# Patient Record
Sex: FEMALE | Race: WHITE | NOT HISPANIC OR LATINO | Employment: FULL TIME | URBAN - METROPOLITAN AREA
[De-identification: names, ages, dates, MRNs, and addresses within clinical notes are randomized per-mention and may not be internally consistent; named-entity substitution may affect disease eponyms.]

---

## 2020-04-17 ENCOUNTER — HOSPITAL ENCOUNTER (OUTPATIENT)
Facility: HOSPITAL | Age: 40
Discharge: HOME | End: 2020-04-19
Attending: EMERGENCY MEDICINE | Admitting: INTERNAL MEDICINE
Payer: COMMERCIAL

## 2020-04-17 DIAGNOSIS — J02.9 PHARYNGITIS, UNSPECIFIED ETIOLOGY: ICD-10-CM

## 2020-04-17 DIAGNOSIS — R00.0 TACHYCARDIA: Primary | ICD-10-CM

## 2020-04-17 DIAGNOSIS — R07.9 CHEST PAIN: ICD-10-CM

## 2020-04-17 DIAGNOSIS — R50.9 ACUTE FEBRILE ILLNESS: ICD-10-CM

## 2020-04-17 DIAGNOSIS — J02.9 SORE THROAT: ICD-10-CM

## 2020-04-17 DIAGNOSIS — R05.9 COUGH: ICD-10-CM

## 2020-04-17 LAB
ALBUMIN SERPL BCP-MCNC: 4 G/DL (ref 3.5–5.2)
ALP SERPL-CCNC: 60 U/L (ref 55–135)
ALT SERPL W/O P-5'-P-CCNC: 16 U/L (ref 10–44)
ANION GAP SERPL CALC-SCNC: 12 MMOL/L (ref 8–16)
AST SERPL-CCNC: 13 U/L (ref 10–40)
B-HCG UR QL: NEGATIVE
BASOPHILS # BLD AUTO: 0.02 K/UL (ref 0–0.2)
BASOPHILS NFR BLD: 0.2 % (ref 0–1.9)
BILIRUB SERPL-MCNC: 0.5 MG/DL (ref 0.1–1)
BILIRUB UR QL STRIP: NEGATIVE
BNP SERPL-MCNC: 13 PG/ML (ref 0–99)
BUN SERPL-MCNC: 14 MG/DL (ref 6–20)
CALCIUM SERPL-MCNC: 8.6 MG/DL (ref 8.7–10.5)
CHLORIDE SERPL-SCNC: 103 MMOL/L (ref 95–110)
CLARITY UR: CLEAR
CO2 SERPL-SCNC: 23 MMOL/L (ref 23–29)
COLOR UR: YELLOW
CREAT SERPL-MCNC: 0.6 MG/DL (ref 0.5–1.4)
CTP QC/QA: YES
D DIMER PPP IA.FEU-MCNC: 0.49 UG/ML FEU
DEPRECATED S PYO AG THROAT QL EIA: NEGATIVE
DIFFERENTIAL METHOD: ABNORMAL
EOSINOPHIL # BLD AUTO: 0.1 K/UL (ref 0–0.5)
EOSINOPHIL NFR BLD: 0.5 % (ref 0–8)
ERYTHROCYTE [DISTWIDTH] IN BLOOD BY AUTOMATED COUNT: 14.8 % (ref 11.5–14.5)
EST. GFR  (AFRICAN AMERICAN): >60 ML/MIN/1.73 M^2
EST. GFR  (NON AFRICAN AMERICAN): >60 ML/MIN/1.73 M^2
GLUCOSE SERPL-MCNC: 97 MG/DL (ref 70–110)
GLUCOSE UR QL STRIP: NEGATIVE
HCT VFR BLD AUTO: 36.9 % (ref 37–48.5)
HGB BLD-MCNC: 12 G/DL (ref 12–16)
HGB UR QL STRIP: NEGATIVE
IMM GRANULOCYTES # BLD AUTO: 0.02 K/UL (ref 0–0.04)
IMM GRANULOCYTES NFR BLD AUTO: 0.2 % (ref 0–0.5)
KETONES UR QL STRIP: ABNORMAL
LACTATE SERPL-SCNC: 0.8 MMOL/L (ref 0.5–1.9)
LEUKOCYTE ESTERASE UR QL STRIP: NEGATIVE
LYMPHOCYTES # BLD AUTO: 0.8 K/UL (ref 1–4.8)
LYMPHOCYTES NFR BLD: 7.3 % (ref 18–48)
MCH RBC QN AUTO: 27.3 PG (ref 27–31)
MCHC RBC AUTO-ENTMCNC: 32.5 G/DL (ref 32–36)
MCV RBC AUTO: 84 FL (ref 82–98)
MONOCYTES # BLD AUTO: 0.8 K/UL (ref 0.3–1)
MONOCYTES NFR BLD: 7.4 % (ref 4–15)
NEUTROPHILS # BLD AUTO: 9.1 K/UL (ref 1.8–7.7)
NEUTROPHILS NFR BLD: 84.4 % (ref 38–73)
NITRITE UR QL STRIP: NEGATIVE
NRBC BLD-RTO: 0 /100 WBC
PH UR STRIP: 6 [PH] (ref 5–8)
PLATELET # BLD AUTO: 234 K/UL (ref 150–350)
PMV BLD AUTO: 10.1 FL (ref 9.2–12.9)
POTASSIUM SERPL-SCNC: 3.7 MMOL/L (ref 3.5–5.1)
PROT SERPL-MCNC: 7.4 G/DL (ref 6–8.4)
PROT UR QL STRIP: NEGATIVE
RBC # BLD AUTO: 4.39 M/UL (ref 4–5.4)
SARS-COV-2 RDRP RESP QL NAA+PROBE: NEGATIVE
SODIUM SERPL-SCNC: 138 MMOL/L (ref 136–145)
SP GR UR STRIP: 1.01 (ref 1–1.03)
TROPONIN I SERPL DL<=0.01 NG/ML-MCNC: <0.03 NG/ML
TSH SERPL DL<=0.005 MIU/L-ACNC: 0.84 UIU/ML (ref 0.34–5.6)
URN SPEC COLLECT METH UR: ABNORMAL
UROBILINOGEN UR STRIP-ACNC: NEGATIVE EU/DL
WBC # BLD AUTO: 10.82 K/UL (ref 3.9–12.7)

## 2020-04-17 PROCEDURE — 83605 ASSAY OF LACTIC ACID: CPT

## 2020-04-17 PROCEDURE — 84443 ASSAY THYROID STIM HORMONE: CPT

## 2020-04-17 PROCEDURE — U0002 COVID-19 LAB TEST NON-CDC: HCPCS

## 2020-04-17 PROCEDURE — 96361 HYDRATE IV INFUSION ADD-ON: CPT

## 2020-04-17 PROCEDURE — 87880 STREP A ASSAY W/OPTIC: CPT

## 2020-04-17 PROCEDURE — 25000003 PHARM REV CODE 250: Performed by: EMERGENCY MEDICINE

## 2020-04-17 PROCEDURE — 99285 EMERGENCY DEPT VISIT HI MDM: CPT | Mod: 25

## 2020-04-17 PROCEDURE — G0378 HOSPITAL OBSERVATION PER HR: HCPCS

## 2020-04-17 PROCEDURE — 85025 COMPLETE CBC W/AUTO DIFF WBC: CPT

## 2020-04-17 PROCEDURE — U0002 COVID-19 LAB TEST NON-CDC: HCPCS | Mod: 91

## 2020-04-17 PROCEDURE — 84484 ASSAY OF TROPONIN QUANT: CPT

## 2020-04-17 PROCEDURE — 80053 COMPREHEN METABOLIC PANEL: CPT

## 2020-04-17 PROCEDURE — 81025 URINE PREGNANCY TEST: CPT | Performed by: EMERGENCY MEDICINE

## 2020-04-17 PROCEDURE — 87081 CULTURE SCREEN ONLY: CPT

## 2020-04-17 PROCEDURE — 36415 COLL VENOUS BLD VENIPUNCTURE: CPT

## 2020-04-17 PROCEDURE — 85379 FIBRIN DEGRADATION QUANT: CPT

## 2020-04-17 PROCEDURE — 81003 URINALYSIS AUTO W/O SCOPE: CPT

## 2020-04-17 PROCEDURE — 93005 ELECTROCARDIOGRAM TRACING: CPT | Performed by: INTERNAL MEDICINE

## 2020-04-17 PROCEDURE — 83880 ASSAY OF NATRIURETIC PEPTIDE: CPT

## 2020-04-17 RX ORDER — AMOXICILLIN 250 MG/1
500 CAPSULE ORAL
Status: COMPLETED | OUTPATIENT
Start: 2020-04-17 | End: 2020-04-17

## 2020-04-17 RX ORDER — ACETAMINOPHEN 325 MG/1
650 TABLET ORAL EVERY 6 HOURS PRN
Status: DISCONTINUED | OUTPATIENT
Start: 2020-04-17 | End: 2020-04-19 | Stop reason: HOSPADM

## 2020-04-17 RX ORDER — GLUCAGON 1 MG
1 KIT INJECTION
Status: DISCONTINUED | OUTPATIENT
Start: 2020-04-17 | End: 2020-04-19 | Stop reason: HOSPADM

## 2020-04-17 RX ORDER — IBUPROFEN 200 MG
24 TABLET ORAL
Status: DISCONTINUED | OUTPATIENT
Start: 2020-04-17 | End: 2020-04-19 | Stop reason: HOSPADM

## 2020-04-17 RX ORDER — ENOXAPARIN SODIUM 100 MG/ML
40 INJECTION SUBCUTANEOUS EVERY 24 HOURS
Status: DISCONTINUED | OUTPATIENT
Start: 2020-04-18 | End: 2020-04-19 | Stop reason: HOSPADM

## 2020-04-17 RX ORDER — SODIUM CHLORIDE 0.9 % (FLUSH) 0.9 %
10 SYRINGE (ML) INJECTION
Status: DISCONTINUED | OUTPATIENT
Start: 2020-04-17 | End: 2020-04-19 | Stop reason: HOSPADM

## 2020-04-17 RX ORDER — SODIUM CHLORIDE, SODIUM LACTATE, POTASSIUM CHLORIDE, CALCIUM CHLORIDE 600; 310; 30; 20 MG/100ML; MG/100ML; MG/100ML; MG/100ML
INJECTION, SOLUTION INTRAVENOUS CONTINUOUS
Status: DISCONTINUED | OUTPATIENT
Start: 2020-04-17 | End: 2020-04-19 | Stop reason: HOSPADM

## 2020-04-17 RX ORDER — IBUPROFEN 200 MG
16 TABLET ORAL
Status: DISCONTINUED | OUTPATIENT
Start: 2020-04-17 | End: 2020-04-19 | Stop reason: HOSPADM

## 2020-04-17 RX ORDER — SODIUM CHLORIDE 9 MG/ML
1000 INJECTION, SOLUTION INTRAVENOUS
Status: COMPLETED | OUTPATIENT
Start: 2020-04-17 | End: 2020-04-17

## 2020-04-17 RX ORDER — ACETAMINOPHEN 500 MG
1000 TABLET ORAL
Status: COMPLETED | OUTPATIENT
Start: 2020-04-17 | End: 2020-04-17

## 2020-04-17 RX ADMIN — IBUPROFEN 600 MG: 400 TABLET ORAL at 07:04

## 2020-04-17 RX ADMIN — SODIUM CHLORIDE 1000 ML: 0.9 INJECTION, SOLUTION INTRAVENOUS at 04:04

## 2020-04-17 RX ADMIN — SODIUM CHLORIDE 1000 ML: 9 INJECTION, SOLUTION INTRAVENOUS at 05:04

## 2020-04-17 RX ADMIN — SODIUM CHLORIDE 1000 ML: 9 INJECTION, SOLUTION INTRAVENOUS at 07:04

## 2020-04-17 RX ADMIN — ACETAMINOPHEN 1000 MG: 500 TABLET, FILM COATED ORAL at 02:04

## 2020-04-17 RX ADMIN — AMOXICILLIN 500 MG: 250 CAPSULE ORAL at 08:04

## 2020-04-17 NOTE — ED PROVIDER NOTES
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  Patient denies dyspnea patient denies chest pain patient denies abdominal pain patient denies rash.        Review of patient's allergies indicates:  No Known Allergies  No past medical history on file.  No past surgical history on file.  No family history on file.  Social History     Tobacco Use    Smoking status: Not on file   Substance Use Topics    Alcohol use: Not on file    Drug use: Not on file     Review of Systems   Constitutional: Positive for fever.   HENT: Positive for sore throat. Negative for congestion, rhinorrhea and trouble swallowing.    Eyes: Negative for visual disturbance.   Respiratory: Positive for cough. Negative for chest tightness, shortness of breath and wheezing.    Cardiovascular: Negative for chest pain, palpitations and leg swelling.   Gastrointestinal: Negative for abdominal distention, abdominal pain, constipation, diarrhea, nausea and vomiting.   Genitourinary: Negative for difficulty urinating, dysuria, flank pain and frequency.   Musculoskeletal: Negative for arthralgias, back pain, joint swelling and neck pain.   Skin: Negative for color change and rash.   Neurological: Negative for dizziness, syncope, speech difficulty, weakness, numbness and headaches.   All other systems reviewed and are negative.      Physical Exam     Initial Vitals   BP Pulse Resp Temp SpO2   04/17/20 1521 04/17/20 1353 04/17/20 1353 04/17/20 1403 04/17/20 1353   (!) 168/96 (!) 139 18 100.2 °F (37.9 °C) 99 %      MAP       --                Physical Exam    Nursing note and vitals reviewed.  Constitutional: She appears well-developed and well-nourished. She is  not diaphoretic. No distress.   HENT:   Head: Normocephalic and atraumatic.   Right Ear: External ear normal.   Left Ear: External ear normal.   Nose: Nose normal.   Mouth/Throat: Oropharyngeal exudate present.   Patient has mild oropharyngeal exudate bilaterally, mild bilateral swelling noted   Eyes: Conjunctivae and EOM are normal. Pupils are equal, round, and reactive to light. Right eye exhibits no discharge. Left eye exhibits no discharge. No scleral icterus.   Neck: Normal range of motion. Neck supple. No thyromegaly present. No tracheal deviation present. No JVD present.   Cardiovascular: Regular rhythm, normal heart sounds and intact distal pulses. Exam reveals no gallop and no friction rub.    No murmur heard.  Tachycardia   Pulmonary/Chest: Breath sounds normal. No stridor. No respiratory distress. She has no wheezes. She has no rhonchi. She has no rales. She exhibits no tenderness.   Abdominal: Soft. Bowel sounds are normal. She exhibits no distension and no mass. There is no tenderness. There is no rebound and no guarding.   Musculoskeletal: Normal range of motion. She exhibits no edema or tenderness.   Lymphadenopathy:     She has no cervical adenopathy.   Neurological: She is alert and oriented to person, place, and time. She has normal strength. She displays normal reflexes. No cranial nerve deficit or sensory deficit.   Skin: Skin is warm and dry. No rash and no abscess noted. No erythema. No pallor.         ED Course   Procedures  Labs Reviewed   CBC W/ AUTO DIFFERENTIAL - Abnormal; Notable for the following components:       Result Value    Hematocrit 36.9 (*)     RDW 14.8 (*)     Gran # (ANC) 9.1 (*)     Lymph # 0.8 (*)     Gran% 84.4 (*)     Lymph% 7.3 (*)     All other components within normal limits   COMPREHENSIVE METABOLIC PANEL - Abnormal; Notable for the following components:    Calcium 8.6 (*)     All other components within normal limits   URINALYSIS, REFLEX TO URINE CULTURE - Abnormal;  Notable for the following components:    Ketones, UA 1+ (*)     All other components within normal limits    Narrative:     Preferred Collection Type->Urine, Clean Catch  Specimen Source->Urine   THROAT SCREEN, RAPID   CULTURE, STREP A,  THROAT   SARS-COV-2 RNA AMPLIFICATION, QUAL    Narrative:     What symptom criteria does the patient meet?->Cough  What symptom criteria does the patient meet?->Fever   LACTIC ACID, PLASMA   TSH   D DIMER, QUANTITATIVE   D DIMER, QUANTITATIVE   TROPONIN I   B-TYPE NATRIURETIC PEPTIDE   TROPONIN I   B-TYPE NATRIURETIC PEPTIDE   SARS-COV-2 (COVID-19) QUALITATIVE PCR   POCT URINE PREGNANCY     EKG Readings: (Independently Interpreted)   Initial Reading: No STEMI. Rhythm: Sinus Tachycardia. Heart Rate: 138. Ectopy: No Ectopy. Conduction: Normal. Axis: Normal.     ECG Results          EKG 12-lead (Final result)  Result time 04/17/20 14:43:32    Final result by Interface, Lab In [facility] (04/17/20 14:43:32)                 Narrative:    Test Reason : R00.0,    Vent. Rate : 138 BPM     Atrial Rate : 138 BPM     P-R Int : 124 ms          QRS Dur : 072 ms      QT Int : 292 ms       P-R-T Axes : 061 084 041 degrees     QTc Int : 442 ms    Sinus tachycardia  Otherwise normal ECG  No previous ECGs available  Confirmed by Park Patel MD ([id number]) on 4/17/2020 2:43:26 PM    Referred By: [email or web address]   SELF           Confirmed By:Park Patel MD                            Imaging Results          X-Ray Chest AP Portable (Final result)  Result time 04/17/20 18:41:01    Final result by Carl Nunez MD (04/17/20 18:41:01)                 Narrative:    REASON: Cough    TECHNIQUE: Portable chest radiograph    COMPARISON: None.    FINDINGS:    The cardiomediastinal silhouette is within normal limits in size.The  pulmonary vascular structures are within normal limits. The lungs are  clear. No acute osseous abnormality.    IMPRESSION:    No acute cardiopulmonary process.    Electronically Signed  "by Carl Nunez on 4/17/2020 6:54 PM                               Medical Decision Making:   History:   Old Medical Records: I decided to obtain old medical records.  Initial Assessment:   Emergent evaluation of a 40-year-old female presenting with sore throat and cough differential diagnosis includes pharyngitis, COVID-19, infection NOS  ED Management:  [place] of Care:  Assumed care from Dr. Clifford pending reassessment.  Agree with their assessment and plan unless otherwise noted.  Notified by nursing that the patient was afebrile after Tylenol, but HR remains 120s.  At this time, had the patient brought to Room for further evaluation.  My history, she did note a "nagging cough", but otherwise acquired the same history as Dr. Clifford.  She actually states that she feels much better now than she did earlier.  On my exam, she had erythema her posterior oropharynx with some exudate on her right tonsil.  No deviation.  Tolerating secretions.  -120.  Lungs clear.  Abdomen soft.  Exam otherwise unremarkable.  She has received 1 L of IV fluids and remained tachycardic.  At this time, will get screening labs as well as give more IV fluids.  Suspect she may have strep pharyngitis based on exam although her rapid screen negative.    Richard Zuniga MD  Emergency Medicine  4/17/2020 5:22 PM    Reassessed after 3 L of fluids, Tylenol, ibuprofen.  HR continues to remain in the 120s, occasionally up to 140s.  She states that she feels fine.  Lab showed WBC 10.8.  BUN 14, creatinine 0.6.  Lactate 0.8.  TSH 0.84.  Troponin less than 0.03, BNP 13.  D-dimer 0.49.  Urine shows no evidence of bleeding or infection.  Chest x-ray shows no acute abnormality.  EKG shows sinus tachycardia without acute ST changes.  At this point, I do not know what is causing her persistent tachycardia.  She denies using any stimulants or illicit drugs.  She has no history of DVT or PE.  Other than pharyngitis, she has no other evidence of " infection.  She has been started on amoxicillin for suspected strep pharyngitis.  Will admit to the hospitalist for further evaluation.                                 Clinical Impression:       ICD-10-CM ICD-9-CM   1. Tachycardia R00.0 785.0   2. Sore throat J02.9 462   3. Cough R05 786.2   4. Pharyngitis, unspecified etiology J02.9 462             ED Disposition Condition    Observation                           Richard Zuniga MD  04/17/20 2207       Richard Zuniga MD  04/17/20 2208

## 2020-04-18 LAB
ANION GAP SERPL CALC-SCNC: 7 MMOL/L (ref 8–16)
BUN SERPL-MCNC: 9 MG/DL (ref 6–20)
CALCIUM SERPL-MCNC: 8.4 MG/DL (ref 8.7–10.5)
CHLORIDE SERPL-SCNC: 109 MMOL/L (ref 95–110)
CO2 SERPL-SCNC: 25 MMOL/L (ref 23–29)
CREAT SERPL-MCNC: 0.5 MG/DL (ref 0.5–1.4)
EST. GFR  (AFRICAN AMERICAN): >60 ML/MIN/1.73 M^2
EST. GFR  (NON AFRICAN AMERICAN): >60 ML/MIN/1.73 M^2
GLUCOSE SERPL-MCNC: 101 MG/DL (ref 70–110)
MAGNESIUM SERPL-MCNC: 1.9 MG/DL (ref 1.6–2.6)
POTASSIUM SERPL-SCNC: 3.9 MMOL/L (ref 3.5–5.1)
SARS-COV-2 RNA RESP QL NAA+PROBE: NOT DETECTED
SODIUM SERPL-SCNC: 141 MMOL/L (ref 136–145)

## 2020-04-18 PROCEDURE — 83735 ASSAY OF MAGNESIUM: CPT

## 2020-04-18 PROCEDURE — G0378 HOSPITAL OBSERVATION PER HR: HCPCS

## 2020-04-18 PROCEDURE — 25000003 PHARM REV CODE 250: Performed by: INTERNAL MEDICINE

## 2020-04-18 PROCEDURE — 36415 COLL VENOUS BLD VENIPUNCTURE: CPT

## 2020-04-18 PROCEDURE — 80048 BASIC METABOLIC PNL TOTAL CA: CPT

## 2020-04-18 PROCEDURE — 63600175 PHARM REV CODE 636 W HCPCS: Performed by: INTERNAL MEDICINE

## 2020-04-18 RX ORDER — HYDROCODONE BITARTRATE AND ACETAMINOPHEN 7.5; 325 MG/1; MG/1
1 TABLET ORAL EVERY 4 HOURS PRN
Status: DISCONTINUED | OUTPATIENT
Start: 2020-04-18 | End: 2020-04-19 | Stop reason: HOSPADM

## 2020-04-18 RX ORDER — CODEINE PHOSPHATE AND GUAIFENESIN 10; 100 MG/5ML; MG/5ML
5 SOLUTION ORAL EVERY 4 HOURS PRN
Status: DISCONTINUED | OUTPATIENT
Start: 2020-04-18 | End: 2020-04-19 | Stop reason: HOSPADM

## 2020-04-18 RX ADMIN — ACETAMINOPHEN 650 MG: 325 TABLET ORAL at 02:04

## 2020-04-18 RX ADMIN — ACETAMINOPHEN 650 MG: 325 TABLET ORAL at 08:04

## 2020-04-18 RX ADMIN — SODIUM CHLORIDE, SODIUM LACTATE, POTASSIUM CHLORIDE, AND CALCIUM CHLORIDE: .6; .31; .03; .02 INJECTION, SOLUTION INTRAVENOUS at 12:04

## 2020-04-18 RX ADMIN — ACETAMINOPHEN 650 MG: 325 TABLET ORAL at 09:04

## 2020-04-18 RX ADMIN — GUAIFENESIN AND CODEINE PHOSPHATE 5 ML: 100; 10 SOLUTION ORAL at 04:04

## 2020-04-18 RX ADMIN — HYDROCODONE BITARTRATE AND ACETAMINOPHEN 1 TABLET: 7.5; 325 TABLET ORAL at 04:04

## 2020-04-18 NOTE — H&P
"Hospital Medicine H&P    Date of Admit: 2020  Date of Transfer: 2020    Subjective:      History of Present Illness / Brief Hospital Course:  Malinda Ling is a 40 y.o. female who  has no past medical history on file.. The patient presented to the on 2020 with a primary complaint of Sore Throat    She works at [employer] and today reported cough, sore throat, subjective fevers/chills starting last night.   Her temperature in the ED was 100.2 and she was found to be tachycardic in the 140s. At the present time, her temp is down to 98.5 and her HR is down to ~120 after IVFs given long with tylenol.   Discussed her case with ED attending who requests she be observed in the hospital as he feels there is a risk of deterioration due to her persistently high HR.   She denies cp, headaches, dysphagia/odynophagia, wheezing, stridor, abd pain, n/v/d, rash.     Past Medical History:  No past medical history on file.    Past Surgical History:  No past surgical history on file.    Allergies:  Review of patient's allergies indicates:  No Known Allergies    Home Medications:  Prior to Admission medications    Not on File       Family History:  Reviewed, non contributory     Social History:  Social History     Tobacco Use    Smoking status: Not on file   Substance Use Topics    Alcohol use: Not on file    Drug use: Not on file       Review of Systems:  Pertinent items are noted in HPI. All other systems are reviewed and are negative.     Objective:   Last 24 Hour Vital Signs:  BP  Min: 136/90  Max: 168/96  Temp  Av.9 °F (37.2 °C)  Min: 98.1 °F (36.7 °C)  Max: 100.2 °F (37.9 °C)  Pulse  Av.8  Min: 115  Max: 139  Resp  Av.2  Min: 17  Max: 21  SpO2  Av.4 %  Min: 97 %  Max: 99 %  Height  Av' 5" (165.1 cm)  Min: 5' 5" (165.1 cm)  Max: 5' 5" (165.1 cm)  Weight  Av.8 kg (198 lb)  Min: 89.8 kg (198 lb)  Max: 89.8 kg (198 lb)  Body mass index is 32.95 kg/m².  I/O last 3 completed shifts:  In: " 1000 [I.V.:1000]  Out: -     Physical Examination:  Physical Exam  Nursing note and vitals reviewed.  Constitutional: She appears well-developed and well-nourished. No distress.   HENT:   Head: Normocephalic and atraumatic.   Right Ear: External ear normal.   Left Ear: External ear normal.   Nose: Nose normal.   Mouth/Throat: Oropharyngeal exudate present.   No significant swelling or evidence of airway compromise.   Eyes: Conjunctivae and EOM are normal. Pupils are equal, round, and reactive to light. Right eye exhibits no discharge. Left eye exhibits no discharge. No scleral icterus.   Neck: Normal range of motion. Neck supple. No thyromegaly present. No tracheal deviation present. No JVD present.   Cardiovascular: Regular rhythm, normal heart sounds and intact distal pulses. Exam reveals no gallop and no friction rub.    No murmur heard.  Tachycardic, regular rhythm   Pulmonary/Chest: Breath sounds normal. No stridor. No respiratory distress. She has no wheezes. She has no rhonchi. She has no rales. She exhibits no tenderness.   Abdominal: Soft. Bowel sounds are normal. She exhibits no distension and no mass. There is no tenderness. There is no rebound and no guarding.   Musculoskeletal: Normal range of motion. She exhibits no edema or tenderness.   Lymphadenopathy:     She has no cervical adenopathy.   Neurological: She is alert and oriented to person, place, and time. She has normal strength. She displays normal reflexes. No cranial nerve deficit or sensory deficit.   Skin: Skin is warm and dry. No rash and no abscess noted. No erythema. No pallor.     Laboratory:  Most Recent Data:  CBC:   Lab Results   Component Value Date    WBC 10.82 04/17/2020    HGB 12.0 04/17/2020    HCT 36.9 (L) 04/17/2020     04/17/2020    MCV 84 04/17/2020    RDW 14.8 (H) 04/17/2020     BMP:   Lab Results   Component Value Date     04/17/2020    K 3.7 04/17/2020     04/17/2020    CO2 23 04/17/2020    BUN 14  04/17/2020    GLU 97 04/17/2020    CALCIUM 8.6 (L) 04/17/2020     LFTs:   Lab Results   Component Value Date    PROT 7.4 04/17/2020    ALBUMIN 4.0 04/17/2020    BILITOT 0.5 04/17/2020    AST 13 04/17/2020    ALKPHOS 60 04/17/2020    ALT 16 04/17/2020     DM:   Lab Results   Component Value Date    CREATININE 0.6 04/17/2020     Thyroid:   Lab Results   Component Value Date    TSH 0.840 04/17/2020       Cardiac:   Lab Results   Component Value Date    TROPONINI <0.030 04/17/2020    BNP 13 04/17/2020     Urinalysis:   Lab Results   Component Value Date    COLORU Yellow 04/17/2020    SPECGRAV 1.010 04/17/2020    NITRITE Negative 04/17/2020    KETONESU 1+ (A) 04/17/2020    UROBILINOGEN Negative 04/17/2020       Trended Lab Data:  Recent Labs   Lab 04/17/20  1723   WBC 10.82   HGB 12.0   HCT 36.9*      MCV 84   RDW 14.8*      K 3.7      CO2 23   BUN 14   GLU 97   CALCIUM 8.6*   PROT 7.4   ALBUMIN 4.0   BILITOT 0.5   AST 13   ALKPHOS 60   ALT 16       Trended Cardiac Data:  Recent Labs   Lab 04/17/20  1723   TROPONINI <0.030   BNP 13       Microbiology Data:  F/u covid-19 PCR testing  F/u GAS culture       Other Results:  EKG (my interpretation): sinus tachycardia.    Radiology:  Imaging Results          X-Ray Chest AP Portable (Final result)  Result time 04/17/20 18:41:01    Final result by Carl Nunez MD (04/17/20 18:41:01)                 Narrative:    REASON: Cough    TECHNIQUE: Portable chest radiograph    COMPARISON: None.    FINDINGS:    The cardiomediastinal silhouette is within normal limits in size.The  pulmonary vascular structures are within normal limits. The lungs are  clear. No acute osseous abnormality.    IMPRESSION:    No acute cardiopulmonary process.    Electronically Signed by Carl Nunez on 4/17/2020 6:54 PM                               Assessment/Plan:     Malinda Ling is a 40 y.o. female with:    Acute febrile illness, r/o COVID-19   - f/u GAS culture, rapid strep was  negative  - negative covid screen but PCR collected in ED, f/u   - prn antipyretics, f/u culture data  - supportive care for symptoms and continue IVF     Sinus tachycardia due to the above          Kwasi Melendez

## 2020-04-18 NOTE — SUBJECTIVE & OBJECTIVE
Interval History: acute febrile illness    Review of Systems   Constitutional: Positive for fatigue.   HENT: Positive for sore throat.    Eyes: Negative.    Respiratory: Positive for cough.    Cardiovascular: Negative.    Gastrointestinal: Negative.    Endocrine: Negative.    Genitourinary: Negative.    Musculoskeletal: Negative.    Skin: Negative.    Allergic/Immunologic: Negative.    Neurological: Negative.    Hematological: Negative.    All other systems reviewed and are negative.    Objective:     Vital Signs (Most Recent):  Temp: 98.4 °F (36.9 °C) (04/18/20 0755)  Pulse: 103 (04/18/20 0755)  Resp: 20 (04/18/20 0755)  BP: (!) 137/91 (04/18/20 0755)  SpO2: 99 % (04/18/20 0755) Vital Signs (24h Range):  Temp:  [98.1 °F (36.7 °C)-100.2 °F (37.9 °C)] 98.4 °F (36.9 °C)  Pulse:  [103-139] 103  Resp:  [17-21] 20  SpO2:  [97 %-99 %] 99 %  BP: (131-168)/(80-96) 137/91     Weight: 87.1 kg (192 lb)  Body mass index is 31.95 kg/m².    Intake/Output Summary (Last 24 hours) at 4/18/2020 0844  Last data filed at 4/17/2020 2102  Gross per 24 hour   Intake 3000 ml   Output --   Net 3000 ml      Physical Exam   Constitutional: She is oriented to person, place, and time. She appears well-developed and well-nourished.   HENT:   Head: Normocephalic and atraumatic.   Pharyngitis    Eyes: Pupils are equal, round, and reactive to light. Conjunctivae and EOM are normal.   Neck: Normal range of motion. Neck supple.   Cardiovascular: Normal rate, regular rhythm, normal heart sounds and intact distal pulses.   Pulmonary/Chest: Effort normal and breath sounds normal.   Abdominal: Soft. Bowel sounds are normal.   Musculoskeletal: Normal range of motion.   Neurological: She is alert and oriented to person, place, and time.   Skin: Skin is warm and dry. Capillary refill takes less than 2 seconds.   Psychiatric: She has a normal mood and affect. Her behavior is normal. Judgment and thought content normal.   Nursing note and vitals  reviewed.      Significant Labs:   BMP:   Recent Labs   Lab 04/18/20  0517         K 3.9      CO2 25   BUN 9   CREATININE 0.5   CALCIUM 8.4*   MG 1.9     CBC:   Recent Labs   Lab 04/17/20  1723   WBC 10.82   HGB 12.0   HCT 36.9*          Significant Imaging: I have reviewed and interpreted all pertinent imaging results/findings within the past 24 hours.

## 2020-04-18 NOTE — PROGRESS NOTES
[facility] Medicine  Progress Note    Patient Name: Malinda Ling  MRN: [id number]  Patient Class: OP- Observation   Admission Date: 4/17/2020  Length of Stay: 0 days  Attending Physician: Hunter Bermeo MD  Primary Care Provider: Primary Doctor No        Subjective:     Principal Problem:Acute febrile illness        HPI:  Malinda Ling is a 40 y.o. female who  has no past medical history on file.. The patient presented to the on 4/17/2020 with a primary complaint of Sore Throat     She works at [employer] and today reported cough, sore throat, subjective fevers/chills starting last night.   Her temperature in the ED was 100.2 and she was found to be tachycardic in the 140s. At the present time, her temp is down to 98.5 and her HR is down to ~120 after IVFs given long with tylenol.   Discussed her case with ED attending who requests she be observed in the hospital as he feels there is a risk of deterioration due to her persistently high HR.   She denies cp, headaches, dysphagia/odynophagia, wheezing, stridor, abd pain, n/v/d, rash.        Overview/Hospital Course:  04/18 COVID-19 PCR pending; screen negative. Labs otherwise normal review. Still sore throat and dry hacking cough. Tmax 100.2; currently apyrexic    Interval History: acute febrile illness    Review of Systems   Constitutional: Positive for fatigue.   HENT: Positive for sore throat.    Eyes: Negative.    Respiratory: Positive for cough.    Cardiovascular: Negative.    Gastrointestinal: Negative.    Endocrine: Negative.    Genitourinary: Negative.    Musculoskeletal: Negative.    Skin: Negative.    Allergic/Immunologic: Negative.    Neurological: Negative.    Hematological: Negative.    All other systems reviewed and are negative.    Objective:     Vital Signs (Most Recent):  Temp: 98.4 °F (36.9 °C) (04/18/20 0755)  Pulse: 103 (04/18/20 0755)  Resp: 20 (04/18/20 0755)  BP: (!) 137/91 (04/18/20 0755)  SpO2: 99 % (04/18/20 0755)  Vital Signs (24h Range):  Temp:  [98.1 °F (36.7 °C)-100.2 °F (37.9 °C)] 98.4 °F (36.9 °C)  Pulse:  [103-139] 103  Resp:  [17-21] 20  SpO2:  [97 %-99 %] 99 %  BP: (131-168)/(80-96) 137/91     Weight: 87.1 kg (192 lb)  Body mass index is 31.95 kg/m².    Intake/Output Summary (Last 24 hours) at 4/18/2020 0844  Last data filed at 4/17/2020 2102  Gross per 24 hour   Intake 3000 ml   Output --   Net 3000 ml      Physical Exam   Constitutional: She is oriented to person, place, and time. She appears well-developed and well-nourished.   HENT:   Head: Normocephalic and atraumatic.   Pharyngitis    Eyes: Pupils are equal, round, and reactive to light. Conjunctivae and EOM are normal.   Neck: Normal range of motion. Neck supple.   Cardiovascular: Normal rate, regular rhythm, normal heart sounds and intact distal pulses.   Pulmonary/Chest: Effort normal and breath sounds normal.   Abdominal: Soft. Bowel sounds are normal.   Musculoskeletal: Normal range of motion.   Neurological: She is alert and oriented to person, place, and time.   Skin: Skin is warm and dry. Capillary refill takes less than 2 seconds.   Psychiatric: She has a normal mood and affect. Her behavior is normal. Judgment and thought content normal.   Nursing note and vitals reviewed.      Significant Labs:   BMP:   Recent Labs   Lab 04/18/20  0517         K 3.9      CO2 25   BUN 9   CREATININE 0.5   CALCIUM 8.4*   MG 1.9     CBC:   Recent Labs   Lab 04/17/20  1723   WBC 10.82   HGB 12.0   HCT 36.9*          Significant Imaging: I have reviewed and interpreted all pertinent imaging results/findings within the past 24 hours.      Assessment/Plan:      No notes have been filed under this hospital service.  Service: Hospital Medicine    VTE Risk Mitigation (From admission, onward)         Ordered     enoxaparin injection 40 mg  Daily      04/17/20 2239     IP VTE HIGH RISK PATIENT  Once      04/17/20 2239                      Hunter BRAR  MD Elvie  Department of Hospital Medicine   [facility]

## 2020-04-18 NOTE — HOSPITAL COURSE
"04/18 COVID-19 PCR pending; screen negative. Labs otherwise normal review. Still sore throat and dry hacking cough. Tmax 100.2; currently apyrexic    04/19 COVID-19 PCR is negative. Patient's heart rate now in high 80-90 sinus. Feels "Good". Had some nausea without vomiting this AM, which has since resolved. Patient would like to be discharged home with outpatient follow-up.  VSS  Lungs: good entry without adventitious sounds  Heart: S1S2 reg  Abdo; soft  "

## 2020-04-18 NOTE — HPI
Malinda Ling is a 40 y.o. female who  has no past medical history on file.. The patient presented to the on 4/17/2020 with a primary complaint of Sore Throat     She works at [employer] and today reported cough, sore throat, subjective fevers/chills starting last night.   Her temperature in the ED was 100.2 and she was found to be tachycardic in the 140s. At the present time, her temp is down to 98.5 and her HR is down to ~120 after IVFs given long with tylenol.   Discussed her case with ED attending who requests she be observed in the hospital as he feels there is a risk of deterioration due to her persistently high HR.   She denies cp, headaches, dysphagia/odynophagia, wheezing, stridor, abd pain, n/v/d, rash.

## 2020-04-19 VITALS
SYSTOLIC BLOOD PRESSURE: 133 MMHG | BODY MASS INDEX: 31.99 KG/M2 | HEIGHT: 65 IN | HEART RATE: 101 BPM | RESPIRATION RATE: 18 BRPM | WEIGHT: 192 LBS | OXYGEN SATURATION: 100 % | TEMPERATURE: 99 F | DIASTOLIC BLOOD PRESSURE: 88 MMHG

## 2020-04-19 PROBLEM — R00.0 TACHYCARDIA: Status: RESOLVED | Noted: 2020-04-17 | Resolved: 2020-04-19

## 2020-04-19 PROBLEM — R50.9 ACUTE FEBRILE ILLNESS: Status: RESOLVED | Noted: 2020-04-17 | Resolved: 2020-04-19

## 2020-04-19 LAB
ANION GAP SERPL CALC-SCNC: 9 MMOL/L (ref 8–16)
BACTERIA THROAT CULT: NORMAL
BUN SERPL-MCNC: 9 MG/DL (ref 6–20)
CALCIUM SERPL-MCNC: 8.7 MG/DL (ref 8.7–10.5)
CHLORIDE SERPL-SCNC: 105 MMOL/L (ref 95–110)
CO2 SERPL-SCNC: 24 MMOL/L (ref 23–29)
CREAT SERPL-MCNC: 0.6 MG/DL (ref 0.5–1.4)
EST. GFR  (AFRICAN AMERICAN): >60 ML/MIN/1.73 M^2
EST. GFR  (NON AFRICAN AMERICAN): >60 ML/MIN/1.73 M^2
GLUCOSE SERPL-MCNC: 103 MG/DL (ref 70–110)
MAGNESIUM SERPL-MCNC: 1.9 MG/DL (ref 1.6–2.6)
POTASSIUM SERPL-SCNC: 3.5 MMOL/L (ref 3.5–5.1)
SODIUM SERPL-SCNC: 138 MMOL/L (ref 136–145)

## 2020-04-19 PROCEDURE — 80048 BASIC METABOLIC PNL TOTAL CA: CPT

## 2020-04-19 PROCEDURE — 25000003 PHARM REV CODE 250: Performed by: INTERNAL MEDICINE

## 2020-04-19 PROCEDURE — 36415 COLL VENOUS BLD VENIPUNCTURE: CPT

## 2020-04-19 PROCEDURE — 63600175 PHARM REV CODE 636 W HCPCS: Performed by: INTERNAL MEDICINE

## 2020-04-19 PROCEDURE — 83735 ASSAY OF MAGNESIUM: CPT

## 2020-04-19 PROCEDURE — G0378 HOSPITAL OBSERVATION PER HR: HCPCS

## 2020-04-19 PROCEDURE — 96374 THER/PROPH/DIAG INJ IV PUSH: CPT

## 2020-04-19 RX ORDER — ONDANSETRON 2 MG/ML
4 INJECTION INTRAMUSCULAR; INTRAVENOUS EVERY 6 HOURS PRN
Status: DISCONTINUED | OUTPATIENT
Start: 2020-04-19 | End: 2020-04-19 | Stop reason: HOSPADM

## 2020-04-19 RX ADMIN — HYDROCODONE BITARTRATE AND ACETAMINOPHEN 1 TABLET: 7.5; 325 TABLET ORAL at 01:04

## 2020-04-19 RX ADMIN — ACETAMINOPHEN 650 MG: 325 TABLET ORAL at 08:04

## 2020-04-19 RX ADMIN — ONDANSETRON 4 MG: 2 INJECTION INTRAMUSCULAR; INTRAVENOUS at 08:04

## 2020-04-19 NOTE — NURSING
Resting , /94, pt requesting to take a shower, advised against any activity that would increase HR. Patient is also c/o increasingly sore throat and headache. White patches noted to bilateral posterior throat, L > R. Reported to Dr Broussard, states continue to monitor, lozenges ordered and given to patient.

## 2020-04-19 NOTE — DISCHARGE SUMMARY
"[facility]  Discharge Summary      Patient Name: Malinda Ling  MRN: [id number]  Admission Date: 4/17/2020  Hospital Length of Stay: 0 days  Discharge Date and Time:  04/19/2020 11:31 AM  Attending Physician: Hunter Bermeo MD   Discharging Provider: Hunter Bermeo MD  Primary Care Provider: Primary Doctor No      HPI:   Malinda Ling is a 40 y.o. female who  has no past medical history on file.. The patient presented to the on 4/17/2020 with a primary complaint of Sore Throat     She works at [employer] and today reported cough, sore throat, subjective fevers/chills starting last night.   Her temperature in the ED was 100.2 and she was found to be tachycardic in the 140s. At the present time, her temp is down to 98.5 and her HR is down to ~120 after IVFs given long with tylenol.   Discussed her case with ED attending who requests she be observed in the hospital as he feels there is a risk of deterioration due to her persistently high HR.   She denies cp, headaches, dysphagia/odynophagia, wheezing, stridor, abd pain, n/v/d, rash.        * No surgery found *      Hospital Course:   04/18 COVID-19 PCR pending; screen negative. Labs otherwise normal review. Still sore throat and dry hacking cough. Tmax 100.2; currently apyrexic    04/19 COVID-19 PCR is negative. Patient's heart rate now in high 80-90 sinus. Feels "Good". Had some nausea without vomiting this AM, which has since resolved. Patient would like to be discharged home with outpatient follow-up.  VSS  Lungs: good entry without adventitious sounds  Heart: S1S2 reg  Abdo; soft     Consults:     No new Assessment & Plan notes have been filed under this hospital service since the last note was generated.  Service: Hospital Medicine    Final Active Diagnoses:      Problems Resolved During this Admission:    Diagnosis Date Noted Date Resolved POA    PRINCIPAL PROBLEM:  Acute febrile illness [R50.9] 04/17/2020 04/19/2020 Yes    " Tachycardia [R00.0] 04/17/2020 04/19/2020 Yes       Discharged Condition: good    Disposition: Home or Self Care    Follow Up:  Follow-up Information     [facility]    Specialty:  Emergency Medicine  Why:  If symptoms worsen  Contact information:  Deepali Smith  [facility] [id number] [phone]  Additional information:  1st floor           [facility]. Schedule an appointment as soon as possible for a visit in 1 week.    Why:  post discharge follow-up  Contact information:  [address]  [facility] [address]  [phone]             Primary Doctor No.               Patient Instructions:      Diet Adult Regular     Activity as tolerated       Significant Diagnostic Studies: Labs:   BMP:   Recent Labs   Lab 04/17/20  1723 04/18/20  0517 04/19/20  0523   GLU 97 101 103    141 138   K 3.7 3.9 3.5    109 105   CO2 23 25 24   BUN 14 9 9   CREATININE 0.6 0.5 0.6   CALCIUM 8.6* 8.4* 8.7   MG  --  1.9 1.9    and CBC   Recent Labs   Lab 04/17/20  1723   WBC 10.82   HGB 12.0   HCT 36.9*          Pending Diagnostic Studies:     None         Medications:  Reconciled Home Medications:      Medication List      You have not been prescribed any medications.         Indwelling Lines/Drains at time of discharge:   Lines/Drains/Airways     None                 Time spent on the discharge of patient: 32 minutes  Patient was seen and examined on the date of discharge and determined to be suitable for discharge.         Hunter Bermeo MD  Department of Hospital Medicine  [facility]

## 2020-04-23 DIAGNOSIS — Z01.84 ANTIBODY RESPONSE EXAMINATION: ICD-10-CM

## 2020-05-23 DIAGNOSIS — Z01.84 ANTIBODY RESPONSE EXAMINATION: ICD-10-CM

## 2020-06-22 DIAGNOSIS — Z01.84 ANTIBODY RESPONSE EXAMINATION: ICD-10-CM

## 2020-07-22 DIAGNOSIS — Z01.84 ANTIBODY RESPONSE EXAMINATION: ICD-10-CM

## 2020-08-21 DIAGNOSIS — Z01.84 ANTIBODY RESPONSE EXAMINATION: ICD-10-CM

## 2020-09-20 DIAGNOSIS — Z01.84 ANTIBODY RESPONSE EXAMINATION: ICD-10-CM

## 2020-09-28 ENCOUNTER — OFFICE VISIT (OUTPATIENT)
Dept: PAIN MEDICINE | Facility: CLINIC | Age: 40
End: 2020-09-28
Payer: COMMERCIAL

## 2020-09-28 VITALS
HEART RATE: 94 BPM | OXYGEN SATURATION: 99 % | HEIGHT: 65 IN | RESPIRATION RATE: 20 BRPM | BODY MASS INDEX: 36.22 KG/M2 | WEIGHT: 217.38 LBS | DIASTOLIC BLOOD PRESSURE: 80 MMHG | SYSTOLIC BLOOD PRESSURE: 142 MMHG | TEMPERATURE: 98 F

## 2020-09-28 DIAGNOSIS — Z11.9 SCREENING EXAMINATION FOR INFECTIOUS DISEASE: ICD-10-CM

## 2020-09-28 DIAGNOSIS — M54.42 CHRONIC LEFT-SIDED LOW BACK PAIN WITH LEFT-SIDED SCIATICA: ICD-10-CM

## 2020-09-28 DIAGNOSIS — G89.29 CHRONIC LEFT-SIDED LOW BACK PAIN WITH LEFT-SIDED SCIATICA: ICD-10-CM

## 2020-09-28 DIAGNOSIS — M54.16 LUMBAR RADICULOPATHY: Primary | ICD-10-CM

## 2020-09-28 PROCEDURE — 3008F BODY MASS INDEX DOCD: CPT | Mod: CPTII,S$GLB,, | Performed by: ANESTHESIOLOGY

## 2020-09-28 PROCEDURE — 99999 PR PBB SHADOW E&M-EST. PATIENT-LVL IV: ICD-10-PCS | Mod: PBBFAC,,, | Performed by: ANESTHESIOLOGY

## 2020-09-28 PROCEDURE — 99204 OFFICE O/P NEW MOD 45 MIN: CPT | Mod: S$GLB,,, | Performed by: ANESTHESIOLOGY

## 2020-09-28 PROCEDURE — 3008F PR BODY MASS INDEX (BMI) DOCUMENTED: ICD-10-PCS | Mod: CPTII,S$GLB,, | Performed by: ANESTHESIOLOGY

## 2020-09-28 PROCEDURE — 99204 PR OFFICE/OUTPT VISIT, NEW, LEVL IV, 45-59 MIN: ICD-10-PCS | Mod: S$GLB,,, | Performed by: ANESTHESIOLOGY

## 2020-09-28 PROCEDURE — 99999 PR PBB SHADOW E&M-EST. PATIENT-LVL IV: CPT | Mod: PBBFAC,,, | Performed by: ANESTHESIOLOGY

## 2020-09-28 RX ORDER — GABAPENTIN 300 MG/1
300 CAPSULE ORAL 3 TIMES DAILY
COMMUNITY
End: 2022-02-17

## 2020-09-28 RX ORDER — SODIUM CHLORIDE, SODIUM LACTATE, POTASSIUM CHLORIDE, CALCIUM CHLORIDE 600; 310; 30; 20 MG/100ML; MG/100ML; MG/100ML; MG/100ML
INJECTION, SOLUTION INTRAVENOUS CONTINUOUS
Status: CANCELLED | OUTPATIENT
Start: 2020-10-07

## 2020-09-28 NOTE — H&P (VIEW-ONLY)
[facility] New Patient Evaluation    Referred by: Dr. James  Reason for referral: back pain    CC:   Chief Complaint   Patient presents with    Establish Care    Low-back Pain      Last 3 PDI Scores 9/28/2020   Pain Disability Index (PDI) 27       HPI:   Malinda Ling is a 40 y.o. female who complains of back pain    Onset: August 1st  Inciting Event: none  Progression: since onset, pain is gradually worsening  Typical Range: 4-8/10  Timing: constant  Quality: throbbing, aching, burning, sharp  Radiation: yes, down the left anterior thigh  Associated numbness or weakness: yes numbness, yes weakness  Exacerbated by: standing, bending, walking  Allievated by: rest, sitting  Is Pain Level Acceptable?: No    Previous Therapies:  PT/OT:   HEP:   Interventions:   Surgery:  Medications:   - NSAIDS:   - MSK Relaxants:   - TCAs:   - SNRIs:   - Topicals:   - Anticonvulsants: gabapentin  - Opioids:     History:    Current Outpatient Medications:     gabapentin (NEURONTIN) 300 MG capsule, Take 300 mg by mouth 3 (three) times daily., Disp: , Rfl:     History reviewed. No pertinent past medical history.    History reviewed. No pertinent surgical history.    History reviewed. No pertinent family history.    Social History     Socioeconomic History    Marital status:      Spouse name: Not on file    Number of children: Not on file    Years of education: Not on file    Highest education level: Not on file   Occupational History    Not on file   Social Needs    Financial resource strain: Not on file    Food insecurity     Worry: Not on file     Inability: Not on file    Transportation needs     Medical: Not on file     Non-medical: Not on file   Tobacco Use    Smoking status: Never Smoker    Smokeless tobacco: Never Used   Substance and Sexual Activity    Alcohol use: Not Currently    Drug use: Never    Sexual activity: Yes     Partners: Male   Lifestyle    Physical activity     Days per week:  "Not on file     Minutes per session: Not on file    Stress: Not on file   Relationships    Social connections     Talks on phone: Not on file     Gets together: Not on file     Attends Advent service: Not on file     Active member of club or organization: Not on file     Attends meetings of clubs or organizations: Not on file     Relationship status: Not on file   Other Topics Concern    Not on file   Social History Narrative    Not on file       Review of patient's allergies indicates:  No Known Allergies    Review of Systems:  General ROS: negative for - fever  Psychological ROS: negative for - hostility  Hematological and Lymphatic ROS: negative for - bleeding problems  Endocrine ROS: negative for - unexpected weight changes  Respiratory ROS: no cough, shortness of breath, or wheezing  Cardiovascular ROS: no chest pain or dyspnea on exertion  Gastrointestinal ROS: no abdominal pain, change in bowel habits, or black or bloody stools  Musculoskeletal ROS: positive for - muscular weakness  Neurological ROS: positive for - numbness/tingling  Dermatological ROS: negative for rash    Physical Exam:  Vitals:    09/28/20 1305   BP: (!) 142/80   Pulse: 94   Resp: 20   Temp: 98 °F (36.7 °C)   TempSrc: Temporal   SpO2: 99%   Weight: 98.6 kg (217 lb 6 oz)   Height: 5' 5" (1.651 m)   PainSc:   6   PainLoc: Back     Body mass index is 36.17 kg/m².     Gen: NAD  Gait: gait intact  Psych:  Mood appropriate for given condition  HEENT: eyes anicteric   GI: Abd soft  CV: RRR  Lungs: breathing unlabored   ROM: limited AROM of the L spine in all planes, full ROM at ankles, knees and hips  Lumbar flexion 90 degrees, extension 50 degrees, side bending 30 degrees.    Sensation: intact to light touch in all dermatomes tested from L2-S1 bilaterally, except decreased over the left medial knee  Reflexes: 2+ b/l patella and achilles  Palpation: Diffusely tender over lumbar paraspinals  -TTP over the b/l greater trochanters and " bilateral SI joint  Tone: normal in the b/l knees and hips   Skin: intact  Extremities: No edema in b/l ankles or hands       Right Left   L2/3 Iliacus Hip flexion  5  5   L3/4 Qudratus Femoris Knee Extension  5  5   L4/5 Tib Anterior Ankle Dorsiflexion   5  5   L5/S1 Extensor Hallicus Longus Great toe extension  5  5                 S1/S2 Gastroc/Soleus Plantar Flexion  5  5       Imaging:  MRI lumbar spine 9/3/2020  FINDINGS:  NOMENCLATURE: Five lumbar type vertebral bodies.     CORD/CAUDA EQUINA: Conus has normal size and signal and ends at a normal level of L1-L2.     ALIGNMENT: Normal.     BONES: Vertebral body heights are maintained.  Small L1, L3 and likely S1 vertebral body hemangiomas.  No aggressive bone marrow signal.     PARASPINAL AREA: Normal.     LUMBAR DISC LEVELS:     T12-L1: No disc herniation or significant posterior osteophytic ridging.  No significant spinal canal or foraminal stenosis.  L1-L2: Minimal disc bulge.  Minimal disc height loss.  Slight loss of normal T2 signal in the disc.  Mild bilateral facet hypertrophy.  No significant spinal canal or foraminal stenosis.  L2-L3: No disc herniation or significant posterior osteophytic ridging.  Mild bilateral facet hypertrophy.  No significant spinal canal or foraminal stenosis.  L3-L4: Minimal disc bulge.  Small left lateral recess/foraminal extrusion.  Mild bilateral facet hypertrophy.  Mild-moderate narrowing of the left lateral recess.  Minimal narrowing of the right lateral recess.  Slight posterior displacement of the descending left L4 nerve root.  No significant spinal canal stenosis.  Mild-moderate left foraminal stenosis.  Minimal right foraminal stenosis.  L4-L5: Mild disc bulge.  Tiny superimposed left foraminal extrusion.  Mild bilateral facet hypertrophy.  Minimal narrowing bilateral lateral recesses.  No significant spinal canal stenosis.  Mild left and minimal right foraminal stenosis.  L5-S1: Mild disc bulge with tiny  superimposed central protrusion.  T2 hyperintense focus within the protrusion may represent granulation tissue.  Mild bilateral facet hypertrophy.  Minimal narrowing of the left lateral recess.  No significant spinal canal or foraminal stenosis.    Labs:  BMP  Lab Results   Component Value Date     04/19/2020    K 3.5 04/19/2020     04/19/2020    CO2 24 04/19/2020    BUN 9 04/19/2020    CREATININE 0.6 04/19/2020    CALCIUM 8.7 04/19/2020    ANIONGAP 9 04/19/2020    ESTGFRAFRICA >60.0 04/19/2020    EGFRNONAA >60.0 04/19/2020     Lab Results   Component Value Date    ALT 16 04/17/2020    AST 13 04/17/2020    ALKPHOS 60 04/17/2020    BILITOT 0.5 04/17/2020       Assessment:   Problem List Items Addressed This Visit     None      Visit Diagnoses     Lumbar radiculopathy    -  Primary    Screening examination for infectious disease        Relevant Orders    COVID-19 Routine Screening    Chronic left-sided low back pain with left-sided sciatica            40 y.o. year old female presents to the office with back pain.  Her pain started at the beginning of august without know event or cause.  Today she has back pain, constant, can range between 5-8/10, sharp, aching, throbbing, grabbing, radiating down the left anterior thigh stopping at the knee.  She reports numbness and weakness.  Her pain is worse with standing, walking, bending and relieved with rest, sitting, and medications.  She has been taking gabapentin 300mg TID.  On exam she has full strength of her lower extremities, 2+ b/l patellar and achilles dtr, decreased sensation to light touch over the left medial knee.  I independently reviewed her lumbar MRI with her and she has L3-4 mild-moderate left foraminal stenosis that I think is causing her pain.  Her pain is limiting her mobility and interfering with her ADL's.  We will schedule for DIOR.  Follow up 2 weeks post injection.     Treatment Plan:   Procedures: left L3/4 TFESI. Procedure explained using  an anatomical model.  Risks, benefits, alternatives explained to patient who verbalized understanding, including increased risk of infection, bleeding, need for additional procedures or surgery, and nerve damage.  Questions regarding the procedure, risks, expected outcome, and possible side effects were solicited and answered to the patient's satisfaction.  Malinda wishes to proceed with the injection.  Verbal and written consent were obtained in clinic today.  PT/OT/HEP: she has done PT and home exercise for the past 6 weeks without relief.  I would like her to continue PT and home exercises while we try and improve her pain with DIOR  Medications: decreased gabapentin to 300mg bid while we set up injection with the goal of down titrating to off depending on results of DIOR  Labs: Reviewed and medications are appropriately dosed for current hepatorenal function.  Imaging: No additional recommended at this time.    : Not applicable    Randall Brown M.D.  Interventional Pain Medicine / Anesthesiology

## 2020-09-28 NOTE — LETTER
September 28, 2020      Ramos James MD  [address] [facility] [address]           [facility]  [address] [facility] [address]  Phone: [phone]  Fax: [phone]          Patient: Malinda Ling   MR Number: [id number]   YOB: 1980   Date of Visit: 9/28/2020       Dear Dr. Ramos James:    Thank you for referring Malinda Ling to me for evaluation. Attached you will find relevant portions of my assessment and plan of care.    If you have questions, please do not hesitate to call me. I look forward to following Malinda Ling along with you.    Sincerely,    Randall Brown MD    Enclosure  CC:  No Recipients    If you would like to receive this communication electronically, please contact [email or web address] or [phone] to request more information on [employer] access.    For providers and/or their staff who would like to refer a patient to [facility], please contact us through our one-stop-shop provider referral line, [facility], at [phone].    If you feel you have received this communication in error or would no longer like to receive these types of communications, please e-mail [email or web address]

## 2020-09-28 NOTE — PROGRESS NOTES
[facility] New Patient Evaluation    Referred by: Dr. James  Reason for referral: back pain    CC:   Chief Complaint   Patient presents with    Establish Care    Low-back Pain      Last 3 PDI Scores 9/28/2020   Pain Disability Index (PDI) 27       HPI:   Malinda Ling is a 40 y.o. female who complains of back pain    Onset: August 1st  Inciting Event: none  Progression: since onset, pain is gradually worsening  Typical Range: 4-8/10  Timing: constant  Quality: throbbing, aching, burning, sharp  Radiation: yes, down the left anterior thigh  Associated numbness or weakness: yes numbness, yes weakness  Exacerbated by: standing, bending, walking  Allievated by: rest, sitting  Is Pain Level Acceptable?: No    Previous Therapies:  PT/OT:   HEP:   Interventions:   Surgery:  Medications:   - NSAIDS:   - MSK Relaxants:   - TCAs:   - SNRIs:   - Topicals:   - Anticonvulsants: gabapentin  - Opioids:     History:    Current Outpatient Medications:     gabapentin (NEURONTIN) 300 MG capsule, Take 300 mg by mouth 3 (three) times daily., Disp: , Rfl:     History reviewed. No pertinent past medical history.    History reviewed. No pertinent surgical history.    History reviewed. No pertinent family history.    Social History     Socioeconomic History    Marital status:      Spouse name: Not on file    Number of children: Not on file    Years of education: Not on file    Highest education level: Not on file   Occupational History    Not on file   Social Needs    Financial resource strain: Not on file    Food insecurity     Worry: Not on file     Inability: Not on file    Transportation needs     Medical: Not on file     Non-medical: Not on file   Tobacco Use    Smoking status: Never Smoker    Smokeless tobacco: Never Used   Substance and Sexual Activity    Alcohol use: Not Currently    Drug use: Never    Sexual activity: Yes     Partners: Male   Lifestyle    Physical activity     Days per week:  "Not on file     Minutes per session: Not on file    Stress: Not on file   Relationships    Social connections     Talks on phone: Not on file     Gets together: Not on file     Attends Shinto service: Not on file     Active member of club or organization: Not on file     Attends meetings of clubs or organizations: Not on file     Relationship status: Not on file   Other Topics Concern    Not on file   Social History Narrative    Not on file       Review of patient's allergies indicates:  No Known Allergies    Review of Systems:  General ROS: negative for - fever  Psychological ROS: negative for - hostility  Hematological and Lymphatic ROS: negative for - bleeding problems  Endocrine ROS: negative for - unexpected weight changes  Respiratory ROS: no cough, shortness of breath, or wheezing  Cardiovascular ROS: no chest pain or dyspnea on exertion  Gastrointestinal ROS: no abdominal pain, change in bowel habits, or black or bloody stools  Musculoskeletal ROS: positive for - muscular weakness  Neurological ROS: positive for - numbness/tingling  Dermatological ROS: negative for rash    Physical Exam:  Vitals:    09/28/20 1305   BP: (!) 142/80   Pulse: 94   Resp: 20   Temp: 98 °F (36.7 °C)   TempSrc: Temporal   SpO2: 99%   Weight: 98.6 kg (217 lb 6 oz)   Height: 5' 5" (1.651 m)   PainSc:   6   PainLoc: Back     Body mass index is 36.17 kg/m².     Gen: NAD  Gait: gait intact  Psych:  Mood appropriate for given condition  HEENT: eyes anicteric   GI: Abd soft  CV: RRR  Lungs: breathing unlabored   ROM: limited AROM of the L spine in all planes, full ROM at ankles, knees and hips  Lumbar flexion 90 degrees, extension 50 degrees, side bending 30 degrees.    Sensation: intact to light touch in all dermatomes tested from L2-S1 bilaterally, except decreased over the left medial knee  Reflexes: 2+ b/l patella and achilles  Palpation: Diffusely tender over lumbar paraspinals  -TTP over the b/l greater trochanters and " bilateral SI joint  Tone: normal in the b/l knees and hips   Skin: intact  Extremities: No edema in b/l ankles or hands       Right Left   L2/3 Iliacus Hip flexion  5  5   L3/4 Qudratus Femoris Knee Extension  5  5   L4/5 Tib Anterior Ankle Dorsiflexion   5  5   L5/S1 Extensor Hallicus Longus Great toe extension  5  5                 S1/S2 Gastroc/Soleus Plantar Flexion  5  5       Imaging:  MRI lumbar spine 9/3/2020  FINDINGS:  NOMENCLATURE: Five lumbar type vertebral bodies.     CORD/CAUDA EQUINA: Conus has normal size and signal and ends at a normal level of L1-L2.     ALIGNMENT: Normal.     BONES: Vertebral body heights are maintained.  Small L1, L3 and likely S1 vertebral body hemangiomas.  No aggressive bone marrow signal.     PARASPINAL AREA: Normal.     LUMBAR DISC LEVELS:     T12-L1: No disc herniation or significant posterior osteophytic ridging.  No significant spinal canal or foraminal stenosis.  L1-L2: Minimal disc bulge.  Minimal disc height loss.  Slight loss of normal T2 signal in the disc.  Mild bilateral facet hypertrophy.  No significant spinal canal or foraminal stenosis.  L2-L3: No disc herniation or significant posterior osteophytic ridging.  Mild bilateral facet hypertrophy.  No significant spinal canal or foraminal stenosis.  L3-L4: Minimal disc bulge.  Small left lateral recess/foraminal extrusion.  Mild bilateral facet hypertrophy.  Mild-moderate narrowing of the left lateral recess.  Minimal narrowing of the right lateral recess.  Slight posterior displacement of the descending left L4 nerve root.  No significant spinal canal stenosis.  Mild-moderate left foraminal stenosis.  Minimal right foraminal stenosis.  L4-L5: Mild disc bulge.  Tiny superimposed left foraminal extrusion.  Mild bilateral facet hypertrophy.  Minimal narrowing bilateral lateral recesses.  No significant spinal canal stenosis.  Mild left and minimal right foraminal stenosis.  L5-S1: Mild disc bulge with tiny  superimposed central protrusion.  T2 hyperintense focus within the protrusion may represent granulation tissue.  Mild bilateral facet hypertrophy.  Minimal narrowing of the left lateral recess.  No significant spinal canal or foraminal stenosis.    Labs:  BMP  Lab Results   Component Value Date     04/19/2020    K 3.5 04/19/2020     04/19/2020    CO2 24 04/19/2020    BUN 9 04/19/2020    CREATININE 0.6 04/19/2020    CALCIUM 8.7 04/19/2020    ANIONGAP 9 04/19/2020    ESTGFRAFRICA >60.0 04/19/2020    EGFRNONAA >60.0 04/19/2020     Lab Results   Component Value Date    ALT 16 04/17/2020    AST 13 04/17/2020    ALKPHOS 60 04/17/2020    BILITOT 0.5 04/17/2020       Assessment:   Problem List Items Addressed This Visit     None      Visit Diagnoses     Lumbar radiculopathy    -  Primary    Screening examination for infectious disease        Relevant Orders    COVID-19 Routine Screening    Chronic left-sided low back pain with left-sided sciatica            40 y.o. year old female presents to the office with back pain.  Her pain started at the beginning of august without know event or cause.  Today she has back pain, constant, can range between 5-8/10, sharp, aching, throbbing, grabbing, radiating down the left anterior thigh stopping at the knee.  She reports numbness and weakness.  Her pain is worse with standing, walking, bending and relieved with rest, sitting, and medications.  She has been taking gabapentin 300mg TID.  On exam she has full strength of her lower extremities, 2+ b/l patellar and achilles dtr, decreased sensation to light touch over the left medial knee.  I independently reviewed her lumbar MRI with her and she has L3-4 mild-moderate left foraminal stenosis that I think is causing her pain.  Her pain is limiting her mobility and interfering with her ADL's.  We will schedule for DIOR.  Follow up 2 weeks post injection.     Treatment Plan:   Procedures: left L3/4 TFESI. Procedure explained using  an anatomical model.  Risks, benefits, alternatives explained to patient who verbalized understanding, including increased risk of infection, bleeding, need for additional procedures or surgery, and nerve damage.  Questions regarding the procedure, risks, expected outcome, and possible side effects were solicited and answered to the patient's satisfaction.  Malinda wishes to proceed with the injection.  Verbal and written consent were obtained in clinic today.  PT/OT/HEP: she has done PT and home exercise for the past 6 weeks without relief.  I would like her to continue PT and home exercises while we try and improve her pain with DIOR  Medications: decreased gabapentin to 300mg bid while we set up injection with the goal of down titrating to off depending on results of DIOR  Labs: Reviewed and medications are appropriately dosed for current hepatorenal function.  Imaging: No additional recommended at this time.    : Not applicable    Randall Brown M.D.  Interventional Pain Medicine / Anesthesiology

## 2020-10-02 ENCOUNTER — TELEPHONE (OUTPATIENT)
Dept: PAIN MEDICINE | Facility: CLINIC | Age: 40
End: 2020-10-02

## 2020-10-02 NOTE — TELEPHONE ENCOUNTER
----- Message from Anne Marie Singh sent at 10/2/2020 12:07 PM CDT -----  Regarding: advice  Contact: [facility] [[id number]]  Patient is requesting a call back from the nurse stated she have an upcoming procedure, and she will be on her cycle.  Would that be a problem?    Please call the patient upon request at phone number [phone].

## 2020-10-04 ENCOUNTER — LAB VISIT (OUTPATIENT)
Dept: FAMILY MEDICINE | Facility: CLINIC | Age: 40
End: 2020-10-04
Payer: COMMERCIAL

## 2020-10-04 DIAGNOSIS — Z11.9 SCREENING EXAMINATION FOR INFECTIOUS DISEASE: ICD-10-CM

## 2020-10-04 LAB — SARS-COV-2 RNA RESP QL NAA+PROBE: NOT DETECTED

## 2020-10-04 PROCEDURE — U0003 INFECTIOUS AGENT DETECTION BY NUCLEIC ACID (DNA OR RNA); SEVERE ACUTE RESPIRATORY SYNDROME CORONAVIRUS 2 (SARS-COV-2) (CORONAVIRUS DISEASE [COVID-19]), AMPLIFIED PROBE TECHNIQUE, MAKING USE OF HIGH THROUGHPUT TECHNOLOGIES AS DESCRIBED BY CMS-2020-01-R: HCPCS

## 2020-10-06 ENCOUNTER — PATIENT MESSAGE (OUTPATIENT)
Dept: SURGERY | Facility: HOSPITAL | Age: 40
End: 2020-10-06

## 2020-10-06 RX ORDER — ACETAMINOPHEN 500 MG
500 TABLET ORAL EVERY 6 HOURS PRN
COMMUNITY
End: 2022-02-17

## 2020-10-07 ENCOUNTER — HOSPITAL ENCOUNTER (OUTPATIENT)
Dept: RADIOLOGY | Facility: HOSPITAL | Age: 40
Discharge: HOME | End: 2020-10-07
Attending: ANESTHESIOLOGY
Payer: COMMERCIAL

## 2020-10-07 ENCOUNTER — HOSPITAL ENCOUNTER (OUTPATIENT)
Facility: HOSPITAL | Age: 40
Discharge: HOME | End: 2020-10-07
Attending: ANESTHESIOLOGY | Admitting: ANESTHESIOLOGY
Payer: COMMERCIAL

## 2020-10-07 VITALS
WEIGHT: 217 LBS | RESPIRATION RATE: 18 BRPM | OXYGEN SATURATION: 100 % | BODY MASS INDEX: 36.15 KG/M2 | SYSTOLIC BLOOD PRESSURE: 130 MMHG | HEIGHT: 65 IN | HEART RATE: 90 BPM | DIASTOLIC BLOOD PRESSURE: 74 MMHG | TEMPERATURE: 98 F

## 2020-10-07 DIAGNOSIS — M54.16 LUMBAR RADICULOPATHY: ICD-10-CM

## 2020-10-07 DIAGNOSIS — M54.16 LUMBAR RADICULOPATHY: Primary | ICD-10-CM

## 2020-10-07 LAB
B-HCG UR QL: NEGATIVE
CTP QC/QA: YES

## 2020-10-07 PROCEDURE — 99152 MOD SED SAME PHYS/QHP 5/>YRS: CPT | Mod: ,,, | Performed by: ANESTHESIOLOGY

## 2020-10-07 PROCEDURE — 99152 PR MOD CONSCIOUS SEDATION, SAME PHYS, 5+ YRS, FIRST 15 MIN: ICD-10-PCS | Mod: ,,, | Performed by: ANESTHESIOLOGY

## 2020-10-07 PROCEDURE — 25500020 PHARM REV CODE 255: Mod: PO | Performed by: ANESTHESIOLOGY

## 2020-10-07 PROCEDURE — 64483 NJX AA&/STRD TFRM EPI L/S 1: CPT | Mod: PO | Performed by: ANESTHESIOLOGY

## 2020-10-07 PROCEDURE — 81025 URINE PREGNANCY TEST: CPT | Mod: PO | Performed by: ANESTHESIOLOGY

## 2020-10-07 PROCEDURE — 63600175 PHARM REV CODE 636 W HCPCS: Mod: PO | Performed by: ANESTHESIOLOGY

## 2020-10-07 PROCEDURE — 76000 FLUOROSCOPY <1 HR PHYS/QHP: CPT | Mod: TC,PO

## 2020-10-07 PROCEDURE — 64483 NJX AA&/STRD TFRM EPI L/S 1: CPT | Mod: LT,,, | Performed by: ANESTHESIOLOGY

## 2020-10-07 PROCEDURE — 25000003 PHARM REV CODE 250: Mod: PO | Performed by: ANESTHESIOLOGY

## 2020-10-07 PROCEDURE — 64483 PR EPIDURAL INJ, ANES/STEROID, TRANSFORAMINAL, LUMB/SACR, SNGL LEVL: ICD-10-PCS | Mod: LT,,, | Performed by: ANESTHESIOLOGY

## 2020-10-07 RX ORDER — SODIUM BICARBONATE 42 MG/ML
INJECTION, SOLUTION INTRAVENOUS
Status: DISCONTINUED | OUTPATIENT
Start: 2020-10-07 | End: 2020-10-07 | Stop reason: HOSPADM

## 2020-10-07 RX ORDER — MIDAZOLAM HYDROCHLORIDE 1 MG/ML
INJECTION INTRAMUSCULAR; INTRAVENOUS
Status: DISCONTINUED | OUTPATIENT
Start: 2020-10-07 | End: 2020-10-07 | Stop reason: HOSPADM

## 2020-10-07 RX ORDER — BUPIVACAINE HYDROCHLORIDE 2.5 MG/ML
INJECTION, SOLUTION EPIDURAL; INFILTRATION; INTRACAUDAL
Status: DISCONTINUED | OUTPATIENT
Start: 2020-10-07 | End: 2020-10-07 | Stop reason: HOSPADM

## 2020-10-07 RX ORDER — LIDOCAINE HYDROCHLORIDE 10 MG/ML
INJECTION, SOLUTION EPIDURAL; INFILTRATION; INTRACAUDAL; PERINEURAL
Status: DISCONTINUED | OUTPATIENT
Start: 2020-10-07 | End: 2020-10-07 | Stop reason: HOSPADM

## 2020-10-07 RX ORDER — SODIUM CHLORIDE, SODIUM LACTATE, POTASSIUM CHLORIDE, CALCIUM CHLORIDE 600; 310; 30; 20 MG/100ML; MG/100ML; MG/100ML; MG/100ML
INJECTION, SOLUTION INTRAVENOUS CONTINUOUS
Status: DISCONTINUED | OUTPATIENT
Start: 2020-10-07 | End: 2020-10-07 | Stop reason: HOSPADM

## 2020-10-07 RX ORDER — TRIAMCINOLONE ACETONIDE 40 MG/ML
INJECTION, SUSPENSION INTRA-ARTICULAR; INTRAMUSCULAR
Status: DISCONTINUED | OUTPATIENT
Start: 2020-10-07 | End: 2020-10-07 | Stop reason: HOSPADM

## 2020-10-07 RX ADMIN — SODIUM CHLORIDE, SODIUM LACTATE, POTASSIUM CHLORIDE, AND CALCIUM CHLORIDE: .6; .31; .03; .02 INJECTION, SOLUTION INTRAVENOUS at 12:10

## 2020-10-07 NOTE — INTERVAL H&P NOTE
The patient has been examined and the H&P has been reviewed:    I concur with the findings and no changes have occurred since H&P was written.    Active Hospital Problems    Diagnosis  POA    Lumbar radiculopathy [M54.16]  Yes      Resolved Hospital Problems   No resolved problems to display.

## 2020-10-07 NOTE — HOSPITAL COURSE
C/o some left thigh pain and weakness in the PACU.  On exam she has full strength of bilateral hip flexion.  She says her thigh pain is similar to her normal pain.  I think she has some normal irritation from injection in the left L3 foramen around irritated nerve.  Discussed I except her feeling of weakness to improve and am also hopeful her typical pain will improve once steroid has a chance to work.  All questions concerns addressed.  Follow up as scheduled.

## 2020-10-07 NOTE — DISCHARGE SUMMARY
[facility]  Discharge Note  Short Stay    Admit Date: 10/7/2020    Discharge Date: 10/7/2020    Attending Physician: Randall Brown     Discharge Provider: Randall Brown    Diagnoses:  Active Hospital Problems    Diagnosis  POA    Lumbar radiculopathy [M54.16]  Yes      Resolved Hospital Problems   No resolved problems to display.       Discharged Condition: Good    Final Diagnoses: Lumbar radiculopathy [M54.16]    Disposition: Home or Self Care    Hospital Course: No complications, uneventful    Outcome of Hospitalization, Treatment, Procedure, or Surgery:  Patient was admitted for outpatient interventional pain management procedure. The patient tolerated the procedure well with no complications.    Follow up/Patient Instructions:  Follow up as scheduled in Pain Management office in 3-4 weeks.  Patient has received instructions and follow up date and time.    Medications:  Continue previous medications    Discharge Procedure Orders   Notify your health care provider if you experience any of the following:  temperature >100.4     Notify your health care provider if you experience any of the following:  persistent nausea and vomiting or diarrhea     Notify your health care provider if you experience any of the following:  severe uncontrolled pain     Notify your health care provider if you experience any of the following:  redness, tenderness, or signs of infection (pain, swelling, redness, odor or green/yellow discharge around incision site)     Notify your health care provider if you experience any of the following:  difficulty breathing or increased cough     Notify your health care provider if you experience any of the following:  severe persistent headache     Notify your health care provider if you experience any of the following:  worsening rash     Notify your health care provider if you experience any of the following:  persistent dizziness, light-headedness, or visual disturbances     Notify your  health care provider if you experience any of the following:  increased confusion or weakness     Activity as tolerated         Discharge Procedure Orders (must include Diet, Follow-up, Activity):   Discharge Procedure Orders (must include Diet, Follow-up, Activity)   Notify your health care provider if you experience any of the following:  temperature >100.4     Notify your health care provider if you experience any of the following:  persistent nausea and vomiting or diarrhea     Notify your health care provider if you experience any of the following:  severe uncontrolled pain     Notify your health care provider if you experience any of the following:  redness, tenderness, or signs of infection (pain, swelling, redness, odor or green/yellow discharge around incision site)     Notify your health care provider if you experience any of the following:  difficulty breathing or increased cough     Notify your health care provider if you experience any of the following:  severe persistent headache     Notify your health care provider if you experience any of the following:  worsening rash     Notify your health care provider if you experience any of the following:  persistent dizziness, light-headedness, or visual disturbances     Notify your health care provider if you experience any of the following:  increased confusion or weakness     Activity as tolerated

## 2020-10-07 NOTE — OP NOTE
Procedure Note    Procedure Date: 10/7/2020    Procedure Performed: left Transforaminal Epidural @ L3, with Fluoroscopic Guidance    Indications: Patient has failed conservative therapy.      Pre-op diagnosis: Lumbar Radiculopathy    Post-op diagnosis: same    Physician: Randall Brown MD    IV Sedation medications: Moderate sedation was achieved with midazolam 2mg.  Continuous monitoring of EKG, blood pressure and pulse oximetry was provided by a registered nurse during the entire course of the procedure under my supervision and recorded in the patient's medical record.   Total time for sedation was 17 minutes.    Medications injected: 0.25% Bupivacaine 1ml, kenalog 40mg, 1 mL sterile, preservative-free normal saline.    Local anesthetic used: 1% Lidocaine 1 ml, 8.4% sodium bicarbonate 0.25ml    Estimated Blood Loss: none    Complications:  none    Technique:  The patient was interviewed in the holding area and Risks/Benefits were discussed, including, but not limited to, the possibility of new or different pain, bleeding or infection.   All questions were answered.  The patient understood and accepted risks.  Consent was reviewed.  A time out was taken to identify the patient, procedure and side of the procedure. The patient was placed in a prone position, then prepped and draped in the usual sterile fashion using ChloraPrep and sterile towels.  The Left L3 neural foramena were identified under fluoroscopic guidance in AP and oblique view.  Local anesthetic was given by raising a wheal and going down to the hub of a 25-gauge 1.5 inch needle.  In oblique view, a 5 inch 22-gauge bent-tip spinal needle was introduced into the foramen @ L3 and positioned in the posterior superior quarter of the foramen.  .5cc of Omnipaque contrast was injected live in an AP fluoroscopic view at each level demonstrating appropriate needle position with contrast spread outlining the respective nerve root and also medially into the  epidural space without intravascular or intrathecal spread.  Then, after negative aspiration, a mixture of 1 mL Bupivacaine 0.25%, 40 mg Kenalog, and 1 mL sterile, preservative-free normal saline. (total 5 mL) was divided injected slowly and incrementally.  The needle(s) was(were) flushed with normal saline and removed.  The patient tolerated the procedure well.  The patient was monitored after the procedure.  Patient was given post procedure and discharge instructions to follow at home. The patient was discharged in a stable condition.    Event Time In   In Facility 1201   In Pre-Procedure 1210   Physician Available    Anesthesia Available    Pre-Op: Bedside Procedure Start    Pre-Op: Bedside Procedure Stop    Pre-Procedure Complete 1237   Out of Pre-Procedure    Anesthesia Start    Anesthesia Start Data Collection    Setup Start    Setup Complete    In Room 1303   Prep Start    Procedure Prep Complete    Procedure Start 1308   Procedure Closing    Emergence    Procedure Finish 1319   Sedation Start 1302   Scope In    Extent Reached    Scope Out    Sedation End 1319   Out of Room 1322   Cleanup Start    Cleanup Complete    Cosmetic Start    Cosmetic Stop    Pain Mgmt In Room    Pain Mgmt Out Room    In Recovery    Anesthesia Finish    Bedside Procedure Start    Bedside Procedure Stop    Recovery Care Complete    Out of Recovery    To Phase II    In Phase II    Pain Mgmt Recovery Start    Pain Mgmt Recovery Stop    Obs Rec Start    Obs Rec Stop    Phase II Care Complete    Out of Phase II    Procedural Care Complete    Discharge    Pain Follow Up Needed    Pain Follow Up Complete

## 2020-10-20 DIAGNOSIS — Z01.84 ANTIBODY RESPONSE EXAMINATION: ICD-10-CM

## 2020-10-22 ENCOUNTER — OFFICE VISIT (OUTPATIENT)
Dept: PAIN MEDICINE | Facility: CLINIC | Age: 40
End: 2020-10-22
Payer: COMMERCIAL

## 2020-10-22 DIAGNOSIS — M54.42 CHRONIC BILATERAL LOW BACK PAIN WITH BILATERAL SCIATICA: ICD-10-CM

## 2020-10-22 DIAGNOSIS — G89.29 CHRONIC BILATERAL LOW BACK PAIN WITH BILATERAL SCIATICA: ICD-10-CM

## 2020-10-22 DIAGNOSIS — M54.16 LUMBAR RADICULOPATHY: Primary | ICD-10-CM

## 2020-10-22 DIAGNOSIS — M54.41 CHRONIC BILATERAL LOW BACK PAIN WITH BILATERAL SCIATICA: ICD-10-CM

## 2020-10-22 PROCEDURE — 99213 PR OFFICE/OUTPT VISIT, EST, LEVL III, 20-29 MIN: ICD-10-PCS | Mod: 95,,, | Performed by: NURSE PRACTITIONER

## 2020-10-22 PROCEDURE — 99213 OFFICE O/P EST LOW 20 MIN: CPT | Mod: 95,,, | Performed by: NURSE PRACTITIONER

## 2020-10-22 RX ORDER — TIZANIDINE 2 MG/1
4 TABLET ORAL NIGHTLY PRN
Qty: 30 TABLET | Refills: 1 | Status: SHIPPED | OUTPATIENT
Start: 2020-10-22 | End: 2020-11-21

## 2020-10-22 NOTE — PROGRESS NOTES
The patient location is: [place]  The chief complaint leading to consultation is: injection follow up    Visit type: audiovisual    Face to Face time with patient: 10  20 minutes of total time spent on the encounter, which includes face to face time and non-face to face time preparing to see the patient (eg, review of tests), Obtaining and/or reviewing separately obtained history, Documenting clinical information in the electronic or other health record, Independently interpreting results (not separately reported) and communicating results to the patient/family/caregiver, or Care coordination (not separately reported).         Each patient to whom he or she provides medical services by telemedicine is:  (1) informed of the relationship between the physician and patient and the respective role of any other health care provider with respect to management of the patient; and (2) notified that he or she may decline to receive medical services by telemedicine and may withdraw from such care at any time.    Notes:     [facility] follow-up Evaluation    Referred by: Dr. James  Reason for referral: back pain    CC:   No chief complaint on file.     Last 3 PDI Scores 9/28/2020   Pain Disability Index (PDI) 27     Interval HPI 10/22/20:  Mrs Tim presents via virtual visit today for follow-up.  She is s/p left L3/4 TFESI on 10/7/20 with 70% relief.  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  She reports improvement in the decreased sensation about her left medial knee.  She denies any weakness or bowel/bladder dysfunction.  She reports taking gabapentin at night prn, states it provides relief but makes her feel funny.  She continues formal PT and HEP.      HPI:   Malinda Tim is a 40 y.o. female who complains of back pain    Onset: August 1st  Inciting Event: none  Progression: since onset, pain is gradually  worsening  Typical Range: 4-8/10  Timing: constant  Quality: throbbing, aching, burning, sharp  Radiation: yes, down the left anterior thigh  Associated numbness or weakness: yes numbness, yes weakness  Exacerbated by: standing, bending, walking  Allievated by: rest, sitting  Is Pain Level Acceptable?: No    Previous Therapies:  PT/OT:   HEP:   Interventions:   Surgery:  Medications:   - NSAIDS:   - MSK Relaxants:   - TCAs:   - SNRIs:   - Topicals:   - Anticonvulsants: gabapentin  - Opioids:     History:    Current Outpatient Medications:     acetaminophen (TYLENOL) 500 MG tablet, Take 500 mg by mouth every 6 (six) hours as needed for Pain., Disp: , Rfl:     gabapentin (NEURONTIN) 300 MG capsule, Take 300 mg by mouth 3 (three) times daily., Disp: , Rfl:     tiZANidine (ZANAFLEX) 2 MG tablet, Take 2 tablets (4 mg total) by mouth nightly as needed., Disp: 30 tablet, Rfl: 1    Past Medical History:   Diagnosis Date    Mitral valve prolapse        Past Surgical History:   Procedure Laterality Date    APPENDECTOMY  1994    TRANSFORAMINAL EPIDURAL INJECTION OF STEROID Left 10/7/2020    Procedure: Injection,steroid,epidural,transforaminal approach L3/4;  Surgeon: Randall Brown MD;  Location: [facility] OR;  Service: Pain Management;  Laterality: Left;       History reviewed. No pertinent family history.    Social History     Socioeconomic History    Marital status:      Spouse name: Not on file    Number of children: Not on file    Years of education: Not on file    Highest education level: Not on file   Occupational History    Not on file   Social Needs    Financial resource strain: Not on file    Food insecurity     Worry: Not on file     Inability: Not on file    Transportation needs     Medical: Not on file     Non-medical: Not on file   Tobacco Use    Smoking status: Never Smoker    Smokeless tobacco: Never Used   Substance and Sexual Activity    Alcohol use: Yes     Comment: socailly    Drug use:  Never    Sexual activity: Yes     Partners: Male   Lifestyle    Physical activity     Days per week: Not on file     Minutes per session: Not on file    Stress: Not on file   Relationships    Social connections     Talks on phone: Not on file     Gets together: Not on file     Attends Shinto service: Not on file     Active member of club or organization: Not on file     Attends meetings of clubs or organizations: Not on file     Relationship status: Not on file   Other Topics Concern    Not on file   Social History Narrative    Not on file       Review of patient's allergies indicates:  No Known Allergies    Review of Systems:  General ROS: negative for - fever  Psychological ROS: negative for - hostility  Hematological and Lymphatic ROS: negative for - bleeding problems  Endocrine ROS: negative for - unexpected weight changes  Respiratory ROS: no cough, shortness of breath, or wheezing  Cardiovascular ROS: no chest pain or dyspnea on exertion  Gastrointestinal ROS: no abdominal pain, change in bowel habits, or black or bloody stools  Musculoskeletal ROS: positive for - muscular weakness  Neurological ROS: positive for - numbness/tingling  Dermatological ROS: negative for rash    Physical Exam:  There were no vitals filed for this visit.  There is no height or weight on file to calculate BMI.     Gen: NAD  Psych:  Mood appropriate for given condition  HEENT: eyes anicteric   Lungs: breathing unlabored       Imaging:  MRI lumbar spine 9/3/2020  FINDINGS:  NOMENCLATURE: Five lumbar type vertebral bodies.     CORD/CAUDA EQUINA: Conus has normal size and signal and ends at a normal level of L1-L2.     ALIGNMENT: Normal.     BONES: Vertebral body heights are maintained.  Small L1, L3 and likely S1 vertebral body hemangiomas.  No aggressive bone marrow signal.     PARASPINAL AREA: Normal.     LUMBAR DISC LEVELS:     T12-L1: No disc herniation or significant posterior osteophytic ridging.  No significant spinal  canal or foraminal stenosis.  L1-L2: Minimal disc bulge.  Minimal disc height loss.  Slight loss of normal T2 signal in the disc.  Mild bilateral facet hypertrophy.  No significant spinal canal or foraminal stenosis.  L2-L3: No disc herniation or significant posterior osteophytic ridging.  Mild bilateral facet hypertrophy.  No significant spinal canal or foraminal stenosis.  L3-L4: Minimal disc bulge.  Small left lateral recess/foraminal extrusion.  Mild bilateral facet hypertrophy.  Mild-moderate narrowing of the left lateral recess.  Minimal narrowing of the right lateral recess.  Slight posterior displacement of the descending left L4 nerve root.  No significant spinal canal stenosis.  Mild-moderate left foraminal stenosis.  Minimal right foraminal stenosis.  L4-L5: Mild disc bulge.  Tiny superimposed left foraminal extrusion.  Mild bilateral facet hypertrophy.  Minimal narrowing bilateral lateral recesses.  No significant spinal canal stenosis.  Mild left and minimal right foraminal stenosis.  L5-S1: Mild disc bulge with tiny superimposed central protrusion.  T2 hyperintense focus within the protrusion may represent granulation tissue.  Mild bilateral facet hypertrophy.  Minimal narrowing of the left lateral recess.  No significant spinal canal or foraminal stenosis.    Labs:  BMP  Lab Results   Component Value Date     04/19/2020    K 3.5 04/19/2020     04/19/2020    CO2 24 04/19/2020    BUN 9 04/19/2020    CREATININE 0.6 04/19/2020    CALCIUM 8.7 04/19/2020    ANIONGAP 9 04/19/2020    ESTGFRAFRICA >60.0 04/19/2020    EGFRNONAA >60.0 04/19/2020     Lab Results   Component Value Date    ALT 16 04/17/2020    AST 13 04/17/2020    ALKPHOS 60 04/17/2020    BILITOT 0.5 04/17/2020     Lab Results   Component Value Date    WBC 10.82 04/17/2020    HGB 12.0 04/17/2020    HCT 36.9 (L) 04/17/2020    MCV 84 04/17/2020     04/17/2020           Assessment:   Problem List Items Addressed This Visit         Neuro    Lumbar radiculopathy - Primary      Other Visit Diagnoses     Chronic bilateral low back pain with bilateral sciatica        Relevant Medications    tiZANidine (ZANAFLEX) 2 MG tablet        40 y.o. year old female presents to the office with back pain.  Her pain started at the beginning of august without know event or cause.  Lumbar spine MRI c/w L3-4 mild-moderate left foraminal stenosis.    10/22/20 - Mrs Tim presents via virtual visit today for follow-up.  She is s/p left L3/4 TFESI on 10/7/20 with 70% relief.  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  She reports improvement in the decreased sensation about her left medial knee.  She denies any weakness or bowel/bladder dysfunction.  She reports taking gabapentin at night prn, states it provides relief but makes her feel funny.  She continues formal PT and HEP.  I have recommended discontinuing gabapentin and trial of tizanidine po qhs prn.  She reports she has a follow up with her neurosurgeon, Dr James, in 2 weeks.  She reports he discussed minimally invasive surgical treatment with her but recommended DIOR first.  She will follow up in 3 months or prn.      Treatment Plan:   Procedures: none  PT/OT/HEP: she has completed formal PT and continues home exercises   Medications: trial of tizanidine sent to pharmacy.  Labs: Reviewed and medications are appropriately dosed for current hepatorenal function.  Imaging: No additional recommended at this time.    : Not applicable    Attending physician:  Randall Brown M.D.  Interventional Pain Medicine / Anesthesiology

## 2020-10-23 ENCOUNTER — TELEPHONE (OUTPATIENT)
Dept: PAIN MEDICINE | Facility: CLINIC | Age: 40
End: 2020-10-23

## 2020-11-19 DIAGNOSIS — Z01.84 ANTIBODY RESPONSE EXAMINATION: ICD-10-CM

## 2021-01-08 ENCOUNTER — IMMUNIZATION (OUTPATIENT)
Dept: FAMILY MEDICINE | Facility: CLINIC | Age: 41
End: 2021-01-08

## 2021-01-08 DIAGNOSIS — Z23 NEED FOR VACCINATION: ICD-10-CM

## 2021-01-08 PROCEDURE — 0001A COVID-19, MRNA, LNP-S, PF, 30 MCG/0.3 ML DOSE VACCINE: CPT | Mod: CV19,S$GLB,, | Performed by: INTERNAL MEDICINE

## 2021-01-08 PROCEDURE — 0001A COVID-19, MRNA, LNP-S, PF, 30 MCG/0.3 ML DOSE VACCINE: ICD-10-PCS | Mod: CV19,S$GLB,, | Performed by: INTERNAL MEDICINE

## 2021-01-08 PROCEDURE — 91300 COVID-19, MRNA, LNP-S, PF, 30 MCG/0.3 ML DOSE VACCINE: CPT | Mod: S$GLB,,, | Performed by: INTERNAL MEDICINE

## 2021-01-08 PROCEDURE — 91300 COVID-19, MRNA, LNP-S, PF, 30 MCG/0.3 ML DOSE VACCINE: ICD-10-PCS | Mod: S$GLB,,, | Performed by: INTERNAL MEDICINE

## 2021-02-08 ENCOUNTER — IMMUNIZATION (OUTPATIENT)
Dept: FAMILY MEDICINE | Facility: CLINIC | Age: 41
End: 2021-02-08
Payer: COMMERCIAL

## 2021-02-08 DIAGNOSIS — Z23 NEED FOR VACCINATION: Primary | ICD-10-CM

## 2021-02-08 PROCEDURE — 91300 COVID-19, MRNA, LNP-S, PF, 30 MCG/0.3 ML DOSE VACCINE: CPT | Mod: ,,, | Performed by: FAMILY MEDICINE

## 2021-02-08 PROCEDURE — 0002A COVID-19, MRNA, LNP-S, PF, 30 MCG/0.3 ML DOSE VACCINE: ICD-10-PCS | Mod: CV19,,, | Performed by: FAMILY MEDICINE

## 2021-02-08 PROCEDURE — 91300 COVID-19, MRNA, LNP-S, PF, 30 MCG/0.3 ML DOSE VACCINE: ICD-10-PCS | Mod: ,,, | Performed by: FAMILY MEDICINE

## 2021-02-08 PROCEDURE — 0002A COVID-19, MRNA, LNP-S, PF, 30 MCG/0.3 ML DOSE VACCINE: CPT | Mod: CV19,,, | Performed by: FAMILY MEDICINE

## 2022-02-22 ENCOUNTER — LAB VISIT (OUTPATIENT)
Dept: LAB | Facility: HOSPITAL | Age: 42
End: 2022-02-22
Attending: INTERNAL MEDICINE
Payer: COMMERCIAL

## 2022-02-22 DIAGNOSIS — R00.2 PALPITATIONS: ICD-10-CM

## 2022-02-22 LAB
ALBUMIN SERPL BCP-MCNC: 3.9 G/DL (ref 3.5–5.2)
ALP SERPL-CCNC: 55 U/L (ref 55–135)
ALT SERPL W/O P-5'-P-CCNC: 17 U/L (ref 10–44)
ANION GAP SERPL CALC-SCNC: 11 MMOL/L (ref 8–16)
AST SERPL-CCNC: 13 U/L (ref 10–40)
BILIRUB SERPL-MCNC: 0.5 MG/DL (ref 0.1–1)
BUN SERPL-MCNC: 18 MG/DL (ref 6–20)
CALCIUM SERPL-MCNC: 9 MG/DL (ref 8.7–10.5)
CHLORIDE SERPL-SCNC: 106 MMOL/L (ref 95–110)
CHOLEST SERPL-MCNC: 229 MG/DL (ref 120–199)
CHOLEST/HDLC SERPL: 4.4 {RATIO} (ref 2–5)
CK SERPL-CCNC: 87 U/L (ref 20–180)
CO2 SERPL-SCNC: 23 MMOL/L (ref 23–29)
CREAT SERPL-MCNC: 0.6 MG/DL (ref 0.5–1.4)
ERYTHROCYTE [DISTWIDTH] IN BLOOD BY AUTOMATED COUNT: 14.3 % (ref 11.5–14.5)
EST. GFR  (AFRICAN AMERICAN): >60 ML/MIN/1.73 M^2
EST. GFR  (NON AFRICAN AMERICAN): >60 ML/MIN/1.73 M^2
GLUCOSE SERPL-MCNC: 107 MG/DL (ref 70–110)
HCT VFR BLD AUTO: 39.3 % (ref 37–48.5)
HDLC SERPL-MCNC: 52 MG/DL (ref 40–75)
HDLC SERPL: 22.7 % (ref 20–50)
HGB BLD-MCNC: 12.5 G/DL (ref 12–16)
LDLC SERPL CALC-MCNC: 163.6 MG/DL (ref 63–159)
MAGNESIUM SERPL-MCNC: 1.8 MG/DL (ref 1.6–2.6)
MCH RBC QN AUTO: 26.5 PG (ref 27–31)
MCHC RBC AUTO-ENTMCNC: 31.8 G/DL (ref 32–36)
MCV RBC AUTO: 83 FL (ref 82–98)
NONHDLC SERPL-MCNC: 177 MG/DL
PLATELET # BLD AUTO: 273 K/UL (ref 150–450)
PMV BLD AUTO: 10 FL (ref 9.2–12.9)
POTASSIUM SERPL-SCNC: 3.9 MMOL/L (ref 3.5–5.1)
PROT SERPL-MCNC: 7.1 G/DL (ref 6–8.4)
RBC # BLD AUTO: 4.71 M/UL (ref 4–5.4)
SODIUM SERPL-SCNC: 140 MMOL/L (ref 136–145)
TRIGL SERPL-MCNC: 67 MG/DL (ref 30–150)
TSH SERPL DL<=0.005 MIU/L-ACNC: 1.42 UIU/ML (ref 0.34–5.6)
WBC # BLD AUTO: 7.11 K/UL (ref 3.9–12.7)

## 2022-02-22 PROCEDURE — 36415 COLL VENOUS BLD VENIPUNCTURE: CPT | Performed by: INTERNAL MEDICINE

## 2022-02-22 PROCEDURE — 83735 ASSAY OF MAGNESIUM: CPT | Performed by: INTERNAL MEDICINE

## 2022-02-22 PROCEDURE — 80061 LIPID PANEL: CPT | Performed by: INTERNAL MEDICINE

## 2022-02-22 PROCEDURE — 84443 ASSAY THYROID STIM HORMONE: CPT | Performed by: INTERNAL MEDICINE

## 2022-02-22 PROCEDURE — 85027 COMPLETE CBC AUTOMATED: CPT | Performed by: INTERNAL MEDICINE

## 2022-02-22 PROCEDURE — 80053 COMPREHEN METABOLIC PANEL: CPT | Performed by: INTERNAL MEDICINE

## 2022-02-22 PROCEDURE — 82550 ASSAY OF CK (CPK): CPT | Performed by: INTERNAL MEDICINE

## 2024-04-14 DIAGNOSIS — J03.90 EXUDATIVE TONSILLITIS: ICD-10-CM

## 2024-04-14 DIAGNOSIS — Z20.818 EXPOSURE TO STREP THROAT: Primary | ICD-10-CM

## 2024-04-14 RX ORDER — AMOXICILLIN 500 MG/1
500 TABLET, FILM COATED ORAL EVERY 12 HOURS
Qty: 20 TABLET | Refills: 0 | Status: SHIPPED | OUTPATIENT
Start: 2024-04-14 | End: 2024-04-24

## 2024-09-21 PROBLEM — E05.90 HYPERTHYROIDISM: Status: ACTIVE | Noted: 2024-09-21

## 2024-09-22 PROBLEM — E66.09 CLASS 1 OBESITY DUE TO EXCESS CALORIES WITHOUT SERIOUS COMORBIDITY WITH BODY MASS INDEX (BMI) OF 34.0 TO 34.9 IN ADULT: Status: ACTIVE | Noted: 2024-09-22

## 2024-09-22 PROBLEM — E66.811 CLASS 1 OBESITY DUE TO EXCESS CALORIES WITHOUT SERIOUS COMORBIDITY WITH BODY MASS INDEX (BMI) OF 34.0 TO 34.9 IN ADULT: Status: ACTIVE | Noted: 2024-09-22

## 2024-11-02 ENCOUNTER — LAB VISIT (OUTPATIENT)
Dept: LAB | Facility: HOSPITAL | Age: 44
End: 2024-11-02
Attending: INTERNAL MEDICINE
Payer: COMMERCIAL

## 2024-11-02 DIAGNOSIS — E05.90 PRETIBIAL MYXEDEMA: Primary | ICD-10-CM

## 2024-11-02 LAB
ALT SERPL W/O P-5'-P-CCNC: 24 U/L (ref 10–44)
BASOPHILS # BLD AUTO: 0.03 K/UL (ref 0–0.2)
BASOPHILS NFR BLD: 0.4 % (ref 0–1.9)
DIFFERENTIAL METHOD BLD: NORMAL
EOSINOPHIL # BLD AUTO: 0.3 K/UL (ref 0–0.5)
EOSINOPHIL NFR BLD: 3.3 % (ref 0–8)
ERYTHROCYTE [DISTWIDTH] IN BLOOD BY AUTOMATED COUNT: 13.5 % (ref 11.5–14.5)
HCT VFR BLD AUTO: 41.8 % (ref 37–48.5)
HGB BLD-MCNC: 13.7 G/DL (ref 12–16)
IMM GRANULOCYTES # BLD AUTO: 0.01 K/UL (ref 0–0.04)
IMM GRANULOCYTES NFR BLD AUTO: 0.1 % (ref 0–0.5)
LYMPHOCYTES # BLD AUTO: 1.6 K/UL (ref 1–4.8)
LYMPHOCYTES NFR BLD: 21.6 % (ref 18–48)
MCH RBC QN AUTO: 27.3 PG (ref 27–31)
MCHC RBC AUTO-ENTMCNC: 32.8 G/DL (ref 32–36)
MCV RBC AUTO: 83 FL (ref 82–98)
MONOCYTES # BLD AUTO: 0.5 K/UL (ref 0.3–1)
MONOCYTES NFR BLD: 6.8 % (ref 4–15)
NEUTROPHILS # BLD AUTO: 5.1 K/UL (ref 1.8–7.7)
NEUTROPHILS NFR BLD: 67.8 % (ref 38–73)
NRBC BLD-RTO: 0 /100 WBC
PLATELET # BLD AUTO: 332 K/UL (ref 150–450)
PMV BLD AUTO: 10 FL (ref 9.2–12.9)
RBC # BLD AUTO: 5.01 M/UL (ref 4–5.4)
T4 FREE SERPL-MCNC: 1.02 NG/DL (ref 0.71–1.51)
TSH SERPL DL<=0.005 MIU/L-ACNC: <0.01 UIU/ML (ref 0.34–5.6)
WBC # BLD AUTO: 7.55 K/UL (ref 3.9–12.7)

## 2024-11-02 PROCEDURE — 84439 ASSAY OF FREE THYROXINE: CPT | Performed by: INTERNAL MEDICINE

## 2024-11-02 PROCEDURE — 85025 COMPLETE CBC W/AUTO DIFF WBC: CPT | Performed by: INTERNAL MEDICINE

## 2024-11-02 PROCEDURE — 84445 ASSAY OF TSI GLOBULIN: CPT | Performed by: INTERNAL MEDICINE

## 2024-11-02 PROCEDURE — 36415 COLL VENOUS BLD VENIPUNCTURE: CPT | Performed by: INTERNAL MEDICINE

## 2024-11-02 PROCEDURE — 84460 ALANINE AMINO (ALT) (SGPT): CPT | Performed by: INTERNAL MEDICINE

## 2024-11-02 PROCEDURE — 84481 FREE ASSAY (FT-3): CPT | Performed by: INTERNAL MEDICINE

## 2024-11-02 PROCEDURE — 84443 ASSAY THYROID STIM HORMONE: CPT | Performed by: INTERNAL MEDICINE

## 2024-11-04 LAB — T3FREE SERPL-MCNC: 4 PG/ML (ref 2–4.4)

## 2024-11-05 LAB — TSI SER-ACNC: 14.7 IU/L (ref 0–0.55)

## 2025-01-29 ENCOUNTER — HOSPITAL ENCOUNTER (OUTPATIENT)
Dept: RADIOLOGY | Facility: HOSPITAL | Age: 45
Discharge: HOME | End: 2025-01-29
Attending: STUDENT IN AN ORGANIZED HEALTH CARE EDUCATION/TRAINING PROGRAM
Payer: COMMERCIAL

## 2025-01-29 ENCOUNTER — OFFICE VISIT (OUTPATIENT)
Dept: OBSTETRICS AND GYNECOLOGY | Facility: CLINIC | Age: 45
End: 2025-01-29
Payer: COMMERCIAL

## 2025-01-29 VITALS
SYSTOLIC BLOOD PRESSURE: 114 MMHG | BODY MASS INDEX: 33.53 KG/M2 | DIASTOLIC BLOOD PRESSURE: 80 MMHG | HEIGHT: 65 IN | WEIGHT: 201.25 LBS

## 2025-01-29 DIAGNOSIS — Z12.39 ENCOUNTER FOR SCREENING FOR MALIGNANT NEOPLASM OF BREAST, UNSPECIFIED SCREENING MODALITY: ICD-10-CM

## 2025-01-29 DIAGNOSIS — Z11.3 SCREEN FOR STD (SEXUALLY TRANSMITTED DISEASE): ICD-10-CM

## 2025-01-29 DIAGNOSIS — Z12.4 SCREENING FOR CERVICAL CANCER: Primary | ICD-10-CM

## 2025-01-29 PROCEDURE — 77063 BREAST TOMOSYNTHESIS BI: CPT | Mod: 26,,, | Performed by: RADIOLOGY

## 2025-01-29 PROCEDURE — 3008F BODY MASS INDEX DOCD: CPT | Mod: CPTII,S$GLB,, | Performed by: STUDENT IN AN ORGANIZED HEALTH CARE EDUCATION/TRAINING PROGRAM

## 2025-01-29 PROCEDURE — 77067 SCR MAMMO BI INCL CAD: CPT | Mod: 26,,, | Performed by: RADIOLOGY

## 2025-01-29 PROCEDURE — 3079F DIAST BP 80-89 MM HG: CPT | Mod: CPTII,S$GLB,, | Performed by: STUDENT IN AN ORGANIZED HEALTH CARE EDUCATION/TRAINING PROGRAM

## 2025-01-29 PROCEDURE — 88175 CYTOPATH C/V AUTO FLUID REDO: CPT | Performed by: STUDENT IN AN ORGANIZED HEALTH CARE EDUCATION/TRAINING PROGRAM

## 2025-01-29 PROCEDURE — 99999 PR PBB SHADOW E&M-EST. PATIENT-LVL III: CPT | Mod: PBBFAC,,, | Performed by: STUDENT IN AN ORGANIZED HEALTH CARE EDUCATION/TRAINING PROGRAM

## 2025-01-29 PROCEDURE — 77067 SCR MAMMO BI INCL CAD: CPT | Mod: TC,PN

## 2025-01-29 PROCEDURE — 87491 CHLMYD TRACH DNA AMP PROBE: CPT | Performed by: STUDENT IN AN ORGANIZED HEALTH CARE EDUCATION/TRAINING PROGRAM

## 2025-01-29 PROCEDURE — 3074F SYST BP LT 130 MM HG: CPT | Mod: CPTII,S$GLB,, | Performed by: STUDENT IN AN ORGANIZED HEALTH CARE EDUCATION/TRAINING PROGRAM

## 2025-01-29 PROCEDURE — 99386 PREV VISIT NEW AGE 40-64: CPT | Mod: S$GLB,,, | Performed by: STUDENT IN AN ORGANIZED HEALTH CARE EDUCATION/TRAINING PROGRAM

## 2025-01-29 PROCEDURE — 87624 HPV HI-RISK TYP POOLED RSLT: CPT | Performed by: STUDENT IN AN ORGANIZED HEALTH CARE EDUCATION/TRAINING PROGRAM

## 2025-01-29 NOTE — PROGRESS NOTES
History & Physical  Gynecology      SUBJECTIVE:     Chief Complaint: Annual Exam       History of Present Illness:    Here for annual exam. Doing well, no issues. Would like STI testing.     Gyn:  x 2 years, not sexually active. No immediate FH of gyn or colon cancer. No hx of abnormal pap. Periods regular.     Son is 10, plays baseball and golf    Just diagnosed with graves dx      Review of patient's allergies indicates:  No Known Allergies    Past Medical History:   Diagnosis Date    Abnormal Pap smear of cervix     Mitral valve prolapse     Thyroid disease      Past Surgical History:   Procedure Laterality Date    APPENDECTOMY      TRANSFORAMINAL EPIDURAL INJECTION OF STEROID Left 10/7/2020    Procedure: Injection,steroid,epidural,transforaminal approach L3/4;  Surgeon: Randall Brown MD;  Location: [facility] OR;  Service: Pain Management;  Laterality: Left;     OB History          1    Para        Term                AB        Living             SAB        IAB        Ectopic        Multiple        Live Births                   Family History   Problem Relation Name Age of Onset    Hypertension Father      Heart disease Father      Osteoarthritis Mother      Breast cancer Neg Hx      Colon cancer Neg Hx      Ovarian cancer Neg Hx      Uterine cancer Neg Hx      Cervical cancer Neg Hx       Social History     Tobacco Use    Smoking status: Never    Smokeless tobacco: Never   Substance Use Topics    Alcohol use: Yes     Comment: socailly    Drug use: Never       Current Outpatient Medications   Medication Sig    famotidine (PEPCID) 20 MG tablet Take 20 mg by mouth every morning.    methIMAzole (TAPAZOLE) 10 MG Tab Take 1 tablet (10 mg total) by mouth 2 (two) times daily.    metoprolol tartrate (LOPRESSOR) 50 MG tablet Take 1 tablet (50 mg total) by mouth 2 (two) times daily.    ibuprofen (ADVIL,MOTRIN) 200 MG tablet Take 400 mg by mouth as needed for Pain. (Patient not taking: Reported on  1/29/2025)     No current facility-administered medications for this visit.         Review of Systems:  Review of Systems   Constitutional:  Negative for chills, fatigue and fever.   HENT:  Negative for congestion.    Eyes:  Negative for visual disturbance.   Respiratory:  Negative for cough and shortness of breath.    Cardiovascular:  Negative for chest pain and palpitations.   Gastrointestinal:  Negative for abdominal distention, abdominal pain, constipation, diarrhea, nausea and vomiting.   Genitourinary:  Negative for difficulty urinating, dysuria, hematuria, vaginal bleeding and vaginal discharge.   Skin:  Negative for rash.   Neurological:  Negative for dizziness, seizures, light-headedness and headaches.   Hematological:  Does not bruise/bleed easily.   Psychiatric/Behavioral:  Negative for dysphoric mood. The patient is not nervous/anxious.         OBJECTIVE:     Physical Exam:  Physical Exam  Vitals reviewed.   Constitutional:       General: She is not in acute distress.     Appearance: Normal appearance. She is well-developed.   HENT:      Head: Normocephalic and atraumatic.   Cardiovascular:      Rate and Rhythm: Normal rate and regular rhythm.   Pulmonary:      Effort: Pulmonary effort is normal.   Chest:   Breasts:     Right: No inverted nipple, mass, nipple discharge, skin change or tenderness.      Left: No inverted nipple, mass, nipple discharge, skin change or tenderness.   Abdominal:      General: There is no distension.      Palpations: Abdomen is soft.      Tenderness: There is no abdominal tenderness.   Genitourinary:     Vagina: Normal.      Comments: Normal external female genitalia, normal hair distribution. Vaginal mucosa pink, moist, well rugated, scant white physiologic discharge. No blood in vault. Cervix pink, non-friable, without lesion. No CMT. Uterus non tender, mobile, not enlarged. Adnexa without fullness or tenderness.    Skin:     General: Skin is warm.   Neurological:      Mental  Status: She is alert and oriented to person, place, and time.   Psychiatric:         Behavior: Behavior normal.         Thought Content: Thought content normal.         Judgment: Judgment normal.           ASSESSMENT:       ICD-10-CM ICD-9-CM    1. Screening for cervical cancer  Z12.4 V76.2 Ambulatory referral/consult to Obstetrics / Gynecology      Liquid-Based Pap Smear, Screening      HPV High Risk Genotypes, PCR      2. Screen for STD (sexually transmitted disease)  Z11.3 V74.5 C. trachomatis/N. gonorrhoeae by AMP DNA      HIV 1/2 Ag/Ab (4th Gen)      Treponema Pallidium Antibodies IgG, IgM      Hepatitis Panel, Acute      3. Encounter for screening for malignant neoplasm of breast, unspecified screening modality  Z12.39 V76.10 CANCELED: Mammo Digital Screening Bilat          Orders Placed This Encounter   Procedures    HPV High Risk Genotypes, PCR     Release to patient->Immediate     Order Specific Question:   Source     Answer:   Cervix     Order Specific Question:   Release to patient     Answer:   Immediate    C. trachomatis/N. gonorrhoeae by AMP DNA     Order Specific Question:   Source:     Answer:   Cervicovaginal    HIV 1/2 Ag/Ab (4th Gen)     Order Specific Question:   Release to patient     Answer:   Immediate     Order Specific Question:   Send normal result to authorizing provider's In Basket if patient is active on MyChart:     Answer:   Yes    Treponema Pallidium Antibodies IgG, IgM     Standing Status:   Future     Number of Occurrences:   1     Standing Expiration Date:   4/29/2026     Order Specific Question:   Send normal result to authorizing provider's In Basket if patient is active on MyChart:     Answer:   Yes    Hepatitis Panel, Acute     Standing Status:   Future     Number of Occurrences:   1     Standing Expiration Date:   1/29/2026     Order Specific Question:   Send normal result to authorizing provider's In Basket if patient is active on MyChart:     Answer:   Yes           Plan:       - Pap cotest  - MMG ordered  - colonoscopy next year  - tobacco cessation n/a  - contraception n/a  - HPV vaccine n/a  - Safe Sex n/a  - DEXA @ 65  - Counseled to take daily multivitamin. If patient is of reproductive age and not on contraception, to take prenatal vitamin. Patient has been counseled on the vitamin D and calcium requirements per ACOG recommendations.  Age   Calcium(mg/day)   Vitamin D (IU/day)  9-18    1300                       600  19-50  1000                       600  51-70  1200                       600  >70      1,200                      800  Patient to start vitamin  - screening labs/swabs per patient request    Areli Bullock M.D.  Obstetrics and Gynecology

## 2025-01-31 LAB
FINAL PATHOLOGIC DIAGNOSIS: NORMAL
Lab: NORMAL

## 2025-02-01 LAB
C TRACH DNA SPEC QL NAA+PROBE: NOT DETECTED
N GONORRHOEA DNA SPEC QL NAA+PROBE: NOT DETECTED

## 2025-02-04 ENCOUNTER — PATIENT MESSAGE (OUTPATIENT)
Dept: OBSTETRICS AND GYNECOLOGY | Facility: CLINIC | Age: 45
End: 2025-02-04
Payer: COMMERCIAL

## 2025-02-05 RX ORDER — TINIDAZOLE 500 MG/1
2 TABLET ORAL
Qty: 8 TABLET | Refills: 0 | Status: SHIPPED | OUTPATIENT
Start: 2025-02-05 | End: 2025-02-07

## (undated) DEVICE — SEE MEDLINE ITEM 152622

## (undated) DEVICE — GLOVE 7.5 PROTEXIS PI MICRO

## (undated) DEVICE — SOL SOD CHLORIDE 0.9% 10ML

## (undated) DEVICE — TRAY NERVE BLOCK

## (undated) DEVICE — APPLICATOR CHLORAPREP CLR 10.5

## (undated) DEVICE — NDL SPINAL SPINOCAN 22GX3.5